# Patient Record
Sex: MALE | Race: WHITE | Employment: OTHER | URBAN - METROPOLITAN AREA
[De-identification: names, ages, dates, MRNs, and addresses within clinical notes are randomized per-mention and may not be internally consistent; named-entity substitution may affect disease eponyms.]

---

## 2017-05-09 ENCOUNTER — OFFICE VISIT (OUTPATIENT)
Dept: DERMATOLOGY | Age: 72
End: 2017-05-09

## 2017-05-09 DIAGNOSIS — L57.0 AK (ACTINIC KERATOSIS): Primary | ICD-10-CM

## 2017-05-09 DIAGNOSIS — L82.1 SEBORRHEIC KERATOSES: ICD-10-CM

## 2017-05-09 DIAGNOSIS — D22.9 BENIGN NEVUS: ICD-10-CM

## 2017-05-09 DIAGNOSIS — L57.8 DIFFUSE PHOTODAMAGE OF SKIN: ICD-10-CM

## 2017-05-09 PROCEDURE — 17003 DESTRUCT PREMALG LES 2-14: CPT | Performed by: DERMATOLOGY

## 2017-05-09 PROCEDURE — 17000 DESTRUCT PREMALG LESION: CPT | Performed by: DERMATOLOGY

## 2017-05-09 PROCEDURE — 99213 OFFICE O/P EST LOW 20 MIN: CPT | Performed by: DERMATOLOGY

## 2017-11-14 ENCOUNTER — OFFICE VISIT (OUTPATIENT)
Dept: DERMATOLOGY | Age: 72
End: 2017-11-14

## 2017-11-14 DIAGNOSIS — L82.0 SEBORRHEIC KERATOSES, INFLAMED: ICD-10-CM

## 2017-11-14 DIAGNOSIS — D22.9 BENIGN NEVUS: ICD-10-CM

## 2017-11-14 DIAGNOSIS — L82.1 SEBORRHEIC KERATOSES: ICD-10-CM

## 2017-11-14 DIAGNOSIS — D48.5 NEOPLASM OF UNCERTAIN BEHAVIOR OF SKIN: Primary | ICD-10-CM

## 2017-11-14 DIAGNOSIS — L57.0 AK (ACTINIC KERATOSIS): ICD-10-CM

## 2017-11-14 PROCEDURE — 11100 PR BIOPSY OF SKIN LESION: CPT | Performed by: DERMATOLOGY

## 2017-11-14 PROCEDURE — 99214 OFFICE O/P EST MOD 30 MIN: CPT | Performed by: DERMATOLOGY

## 2017-11-14 PROCEDURE — 17000 DESTRUCT PREMALG LESION: CPT | Performed by: DERMATOLOGY

## 2017-11-14 PROCEDURE — 17003 DESTRUCT PREMALG LES 2-14: CPT | Performed by: DERMATOLOGY

## 2017-11-14 PROCEDURE — 17110 DESTRUCTION B9 LES UP TO 14: CPT | Performed by: DERMATOLOGY

## 2017-11-14 NOTE — PROGRESS NOTES
[facility]) Dermatology  Michele Jo M.D.  [id number]  1945    71 y.o. male     Date of Visit: 11/14/2017    Chief Complaint:   Chief Complaint   Patient presents with    Skin Exam    6 Month Follow-Up        I was asked to see this patient by Dr. Polo ref. provider found. History of Present Illness:  1. Total body skin exam    Multiple rough papules vertex of scalp. Remain dry. Not itching, bleeding, growing    New inflamed papule left temple. Pruritic. Scaly. Multiple nevi. Stable in size, shape, color. Asymptomatic. Is wearing hats and sunscreen regularly. Kirstin downey    First grandchild due any day    Skin history:  History of actinic keratoses, no history of skin cancer. Status post Efudex 3/14 to his scalp    Review of Systems:  Constitutional: Reports general sense of well-being       Past Medical History, Surgical History, Family History, Medications and Allergies reviewed. Social History:   Social History     Social History    Marital status:      Spouse name: N/A    Number of children: N/A    Years of education: N/A     Occupational History    Not on file. Social History Main Topics    Smoking status: Former Smoker     Quit date: 8/1/1978    Smokeless tobacco: Never Used    Alcohol use No      Comment: 1-2 x year    Drug use: Unknown    Sexual activity: Not on file     Other Topics Concern    Not on file     Social History Narrative    No narrative on file       Physical Examination       -General: Well-appearing, NAD  Normal affect. Total body skin exam including scalp, face, neck, chest, abdomen, back, bilateral upper extremities, bilateral lower extremities, ocular conjunctiva, external lips, and nails was performed. Underwear area not examined. Scattered on the trunk and extremities are multiple well-defined round and oval symmetric smoothly-bordered uniformly brown macules and papules.   Multiple hyperkeratotic stuck on papules

## 2017-11-16 NOTE — PROGRESS NOTES
Addendum: Left upper forehead superficial basal cell carcinoma-return visit for definitive treatment with curettage

## 2017-11-20 ENCOUNTER — TELEPHONE (OUTPATIENT)
Dept: DERMATOLOGY | Age: 72
End: 2017-11-20

## 2017-11-29 ENCOUNTER — OFFICE VISIT (OUTPATIENT)
Dept: DERMATOLOGY | Age: 72
End: 2017-11-29

## 2017-11-29 DIAGNOSIS — C44.310 BASAL CELL CARCINOMA OF FACE: Primary | ICD-10-CM

## 2017-11-29 PROCEDURE — 17281 DSTR MAL LS F/E/E/N/L/M .6-1: CPT | Performed by: DERMATOLOGY

## 2017-11-29 NOTE — PROGRESS NOTES
[facility]) Dermatology  Rj Sousa M.D.  [id number]  1945    71 y.o. male     Date of Visit: 11/29/2017    Chief Complaint:   Chief Complaint   Patient presents with    Other     Curettage left upper forehead        I was asked to see this patient by Dr. Polo ref. provider found. History of Present Illness:  1. Patient presents today for definitive treatment of a superficial basal cell carcinoma left upper forehead. Biopsy done 11/14/17. First grandchild born last week-Nadeem      Review of Systems:  Constitutional: Reports general sense of well-being       Past Medical History, Surgical History, Family History, Medications and Allergies reviewed. Social History:   Social History     Social History    Marital status:      Spouse name: N/A    Number of children: N/A    Years of education: N/A     Occupational History    Not on file. Social History Main Topics    Smoking status: Former Smoker     Quit date: 8/1/1978    Smokeless tobacco: Never Used    Alcohol use No      Comment: 1-2 x year    Drug use: Unknown    Sexual activity: Not on file     Other Topics Concern    Not on file     Social History Narrative    No narrative on file       Physical Examination       -General: Well-appearing, NAD  Site confirmed with patient and prior picture-well healed pink scar. Assessment and Plan     1. Basal cell carcinoma of Left upper forehead-Curettage performed after informed written consent obtained following explanation of risks, benefits, alternatives  -Local anesthesia acheived with 1% lidocaine with epinephrine/sodium bicarbonate. Sharp curettage performed in 3 different directions. First pass size 6mm.  Hemostasis obtained with aluminum chloride.   -Edu re: bleeding, discomfort, infection, scar  -Edu re: wound care, risk of recurrence  6   \

## 2018-01-08 ENCOUNTER — OFFICE VISIT (OUTPATIENT)
Dept: ORTHOPEDIC SURGERY | Age: 73
End: 2018-01-08

## 2018-01-08 VITALS
HEIGHT: 68 IN | HEART RATE: 81 BPM | WEIGHT: 185 LBS | DIASTOLIC BLOOD PRESSURE: 84 MMHG | SYSTOLIC BLOOD PRESSURE: 136 MMHG | BODY MASS INDEX: 28.04 KG/M2

## 2018-01-08 DIAGNOSIS — M25.561 RIGHT KNEE PAIN, UNSPECIFIED CHRONICITY: Primary | ICD-10-CM

## 2018-01-08 PROCEDURE — 99213 OFFICE O/P EST LOW 20 MIN: CPT | Performed by: ORTHOPAEDIC SURGERY

## 2018-01-08 NOTE — PROGRESS NOTES
Examination:    Inspection:  No swelling, ecchymosis or deformity    Palpation:  Tenderness to palpation directly over the medial joint line    Range of Motion:  Well-preserved range of motion, 0-120°. Strength:  4+/5 quad and hamstring strength    Special Tests:  Positive Tho's examination. Stable to varus and valgus stress testing. Negative Lachman's exam    Skin: There are no rashes, ulcerations or lesions. Gait: Mild antalgic gait    Reflex normal deep tendon reflexes    Additional Comments:       Additional Examinations:         Contralateral Exam: Examination of the left knee reveals intact skin. There is no focal tenderness. The patient demonstrates full painless range of motion with regard to flexion and extension. Strength is 5/5 throughout all planes. Ligamentous stability is grossly intact. Examination of the right and left hip reveals intact skin. The patient demonstrates full painless range of motion with regards to flexion, abduction, internal and external rotation. There is no tenderness about the greater trochanter. There is a negative straight leg raise against resistance. Strength is 5/5 throughout all planes. Radiology:     X-rays obtained and reviewed in office:  Views 4 views including AP weightbearing, PA flexed weightbearing, lateral, and skyline  Location right knee  Impression well-maintained medial lateral and patellofemoral joint spaces. Impression:  Encounter Diagnosis   Name Primary?  Right knee pain, unspecified chronicity Yes       Office Procedures:  Orders Placed This Encounter   Procedures    XR KNEE RIGHT (MIN 4 VIEWS)     [id number]     Order Specific Question:   Reason for exam:     Answer:   Pain       Treatment Plan:  Patient has been ordered an MRI of the right knee to evaluate for medial meniscus tear. Patient will continue with over-the-counter medications and ice to control pain symptoms.   Consideration for arthroscopic surgery depending on

## 2018-01-17 ENCOUNTER — OFFICE VISIT (OUTPATIENT)
Dept: ORTHOPEDIC SURGERY | Age: 73
End: 2018-01-17

## 2018-01-17 DIAGNOSIS — M94.261 CHONDROMALACIA OF KNEE, RIGHT: ICD-10-CM

## 2018-01-17 DIAGNOSIS — S83.241A TEAR OF MEDIAL MENISCUS OF RIGHT KNEE, UNSPECIFIED TEAR TYPE, UNSPECIFIED WHETHER OLD OR CURRENT TEAR, INITIAL ENCOUNTER: Primary | ICD-10-CM

## 2018-01-17 PROCEDURE — 99214 OFFICE O/P EST MOD 30 MIN: CPT | Performed by: PHYSICIAN ASSISTANT

## 2018-01-17 NOTE — LETTER
performance of any surgical or medical procedure(s). I am aware that the practice of surgery and medicine is not an exact science, and I acknowledge that no guarantees have been made to me concerning the results of the procedure(s). 5) I consent to the taking of photographs before, during and after the procedure(s) for scientific and educational purposes. I also understand that medical students and residents may participate in the procedure(s) set forth in Paragraph 1, and I consent to their participation under the supervision of the above named physician. 6) I consent to the administration of anesthesia and to the use of such anesthetics as may be deemed advisable by the anesthesiologist engaged to administer anesthesia. 7) I certify that I have read and understand this consent to the surgical or medical procedure(s); that all the information contained herein was disclosed to me by the informing physician prior to my signing; that all blanks or statements requiring insertions or completion were filled in and inapplicable paragraphs, if any, were stricken before I signed; and that all questions asked by me about the procedure(s) have been fully answered by the informing physician in a satisfactory manner.     Would you like to schedule an appointment with Dr. Ricarda Case prior to surgery:   YES  / NO  ______Initial    ________________________________                           _______________________________  Signature of patient                                                                  Witness           Kassi Luevano PA-C / VIOLETA Thrasher M.D.   ________________________________                           ________________________________  Informing Physician Assistant                                                                 Physician (Print)    If patient is unable to sign or is a minor, complete one of the following:

## 2018-01-17 NOTE — PROGRESS NOTES
Chief Complaint    Results (mri rt knee)      History of Present Illness:  Lola Tavarez is a 71 y.o. male returns today for a follow-up on his right knee after undergoing a recent MRI. Symptoms are unchanged, continuing to report medial knee pain with locking and catching sensation. The results of the MRI indicate that he does have a large, trizonal, macerated medial meniscus tear measuring 3-4 cm. He also talked about some generalized inflammation of the MCL and a small dissecting Baker cyst.  The full radiology report as below. Pain Assessment  Location of Pain: Knee  Location Modifiers: Right  Severity of Pain: 4  Frequency of Pain: Intermittent  Aggravating Factors: Other (Comment)  Limiting Behavior: Some  Work-Related Injury: No  Are there other pain locations you wish to document?: No    Medical History:  Patient's medications, allergies, past medical, surgical, social and family histories were reviewed and updated as appropriate. Review of Systems:  Relevant review of systems reviewed and available in the patient's chart    Vital Signs: There were no vitals taken for this visit. General Exam:   Constitutional: Patient is adequately groomed with no evidence of malnutrition  DTRs: Deep tendon reflexes are intact  Mental Status: The patient is oriented to time, place and person. The patient's mood and affect are appropriate. Lymphatic: The lymphatic examination bilaterally reveals all areas to be without enlargement or induration. Vascular: Examination reveals no swelling or calf tenderness. Peripheral pulses are palpable and 2+. Neurological: The patient has good coordination. There is no weakness or sensory deficit. There is no height or weight on file to calculate BMI.     Right Knee Examination:    Inspection:  No swelling, ecchymosis or deformity    Palpation:  Tenderness to palpation directly over the medial joint line    Range of Motion:  Well-preserved range of motion,

## 2018-02-05 ENCOUNTER — TELEPHONE (OUTPATIENT)
Dept: ORTHOPEDIC SURGERY | Age: 73
End: 2018-02-05

## 2018-02-16 ENCOUNTER — HOSPITAL ENCOUNTER (OUTPATIENT)
Dept: SURGERY | Age: 73
Discharge: HOME | End: 2018-02-16
Attending: ORTHOPAEDIC SURGERY | Admitting: ORTHOPAEDIC SURGERY

## 2018-02-16 VITALS
BODY MASS INDEX: 28.49 KG/M2 | OXYGEN SATURATION: 100 % | HEART RATE: 80 BPM | HEIGHT: 68 IN | SYSTOLIC BLOOD PRESSURE: 131 MMHG | TEMPERATURE: 97 F | DIASTOLIC BLOOD PRESSURE: 79 MMHG | RESPIRATION RATE: 16 BRPM | WEIGHT: 188 LBS

## 2018-02-16 DIAGNOSIS — Z98.890 S/P RIGHT KNEE ARTHROSCOPY: Primary | ICD-10-CM

## 2018-02-16 RX ORDER — OXYCODONE HYDROCHLORIDE AND ACETAMINOPHEN 5; 325 MG/1; MG/1
1 TABLET ORAL PRN
Status: COMPLETED | OUTPATIENT
Start: 2018-02-16 | End: 2018-02-16

## 2018-02-16 RX ORDER — HYDROCODONE BITARTRATE AND ACETAMINOPHEN 5; 325 MG/1; MG/1
1 TABLET ORAL EVERY 6 HOURS PRN
Qty: 30 TABLET | Refills: 0 | Status: SHIPPED | OUTPATIENT
Start: 2018-02-16 | End: 2018-02-23

## 2018-02-16 RX ORDER — ONDANSETRON 2 MG/ML
4 INJECTION INTRAMUSCULAR; INTRAVENOUS EVERY 10 MIN PRN
Status: DISCONTINUED | OUTPATIENT
Start: 2018-02-16 | End: 2018-02-17 | Stop reason: HOSPADM

## 2018-02-16 RX ORDER — LABETALOL HYDROCHLORIDE 5 MG/ML
5 INJECTION, SOLUTION INTRAVENOUS EVERY 10 MIN PRN
Status: DISCONTINUED | OUTPATIENT
Start: 2018-02-16 | End: 2018-02-17 | Stop reason: HOSPADM

## 2018-02-16 RX ORDER — SODIUM CHLORIDE, SODIUM LACTATE, POTASSIUM CHLORIDE, CALCIUM CHLORIDE 600; 310; 30; 20 MG/100ML; MG/100ML; MG/100ML; MG/100ML
INJECTION, SOLUTION INTRAVENOUS CONTINUOUS
Status: DISCONTINUED | OUTPATIENT
Start: 2018-02-16 | End: 2018-02-17 | Stop reason: HOSPADM

## 2018-02-16 RX ORDER — LIDOCAINE HYDROCHLORIDE 10 MG/ML
1 INJECTION, SOLUTION EPIDURAL; INFILTRATION; INTRACAUDAL; PERINEURAL
Status: ACTIVE | OUTPATIENT
Start: 2018-02-16 | End: 2018-02-16

## 2018-02-16 RX ORDER — HYDRALAZINE HYDROCHLORIDE 20 MG/ML
5 INJECTION INTRAMUSCULAR; INTRAVENOUS EVERY 10 MIN PRN
Status: DISCONTINUED | OUTPATIENT
Start: 2018-02-16 | End: 2018-02-17 | Stop reason: HOSPADM

## 2018-02-16 RX ORDER — MEPERIDINE HYDROCHLORIDE 50 MG/ML
12.5 INJECTION INTRAMUSCULAR; INTRAVENOUS; SUBCUTANEOUS EVERY 5 MIN PRN
Status: DISCONTINUED | OUTPATIENT
Start: 2018-02-16 | End: 2018-02-17 | Stop reason: HOSPADM

## 2018-02-16 RX ORDER — OXYCODONE HYDROCHLORIDE AND ACETAMINOPHEN 5; 325 MG/1; MG/1
2 TABLET ORAL PRN
Status: COMPLETED | OUTPATIENT
Start: 2018-02-16 | End: 2018-02-16

## 2018-02-16 RX ADMIN — SODIUM CHLORIDE, SODIUM LACTATE, POTASSIUM CHLORIDE, CALCIUM CHLORIDE: 600; 310; 30; 20 INJECTION, SOLUTION INTRAVENOUS at 09:53

## 2018-02-16 RX ADMIN — OXYCODONE HYDROCHLORIDE AND ACETAMINOPHEN 1 TABLET: 5; 325 TABLET ORAL at 12:21

## 2018-02-16 ASSESSMENT — PAIN SCALES - GENERAL
PAINLEVEL_OUTOF10: 0
PAINLEVEL_OUTOF10: 6
PAINLEVEL_OUTOF10: 5

## 2018-02-16 ASSESSMENT — PAIN - FUNCTIONAL ASSESSMENT: PAIN_FUNCTIONAL_ASSESSMENT: 0-10

## 2018-02-16 NOTE — BRIEF OP NOTE
Brief Postoperative Note    Darlin Canales  YOB: 1945  [id number]    Pre-operative Diagnosis: Right KNEE MEDIAL MENISCUS TEAR; CMP     Post-operative Diagnosis: Same    Procedure: RT KNEE DVA; PARTIAL MEDIAL MENISECTOMY; CHONDROPLASTY PFJ     Anesthesia: General    Surgeons/Assistants: Abiel Bañuelos MD     Estimated Blood Loss: less than 50     Complications: None    Specimens: Was Not Obtained    Findings: AS ABOVE    Electronically signed by Abiel Bañuelos MD on 2/16/2018 at 11:46 AM

## 2018-02-17 NOTE — OP NOTE
cartilages  back to a stable rim using a 4.0 shaver. There was some anterior  synovitis, which was excised using a 4.0 shaver and the ArthroCare  radiofrequency device. 2.  THE MEDIAL COMPARTMENT:  The medial compartment had a radial tear of  the posterior horn and mid body for which a partial medial meniscectomy was  performed using a combination of baskets, 4.0 shaver, and an ArthroCare  radiofrequency device sculpting the meniscus tear back to a stable rim. Cartilage surfaces had some grade 2 chondral changes throughout the tibial  plateau and medial femoral condyle for which a chondroplasty was performed  debriding loose cartilages back to a stable rim. 3.  THE NOTCH:  The notch had an intact ACL and PCL with no evidence of  ligamentous instability. 4.  THE LATERAL COMPARTMENT:  The lateral compartment had no evidence of  lateral meniscus tear and the meniscus was thoroughly probed and felt to be  intact. The cartilage surfaces both on the femoral and tibial side were  intact. The knee was then irrigated with copious amounts of irrigation solution. The portals were closed using 4-0 Monocryl suture. Dry sterile compression  dressing was applied. The patient was then taken to the recovery room in  stable condition having tolerated the procedure well.         Deidra Ramirez MD    D: 02/16/2018 11:52:14       T: 02/16/2018 16:30:37     [email or web address]  Job#: [id number]     Doc#: [id number]    CC:  Fernando Sapp MD

## 2018-02-21 ENCOUNTER — HOSPITAL ENCOUNTER (OUTPATIENT)
Dept: PHYSICAL THERAPY | Age: 73
Discharge: HOME | End: 2018-02-28
Admitting: ORTHOPAEDIC SURGERY

## 2018-02-21 NOTE — FLOWSHEET NOTE
verbal/tactile cueing for activities related to improving balance, coordination, kinesthetic sense, posture, motor skill, proprioception  to assist with LE, proximal hip, and core control in self care, mobility, lifting, ambulation and eccentric single leg control. Home Exercise Program:    [x] (14368) Reviewed/Progressed HEP activities related to strengthening, flexibility, endurance, ROM of core, proximal hip and LE for functional self-care, mobility, lifting and ambulation/stair navigation   [x] (66575)Reviewed/Progressed HEP activities related to improving balance, coordination, kinesthetic sense, posture, motor skill, proprioception of core, proximal hip and LE for self care, mobility, lifting, and ambulation/stair navigation      Manual Treatments:  PROM / STM / Oscillations-Mobs:  G-I, II, III, IV (PA's, Inf., Post.)  [x] (62601) Provided manual therapy to mobilize LE, proximal hip and/or LS spine soft tissue/joints for the purpose of modulating pain, promoting relaxation,  increasing ROM, reducing/eliminating soft tissue swelling/inflammation/restriction, improving soft tissue extensibility and allowing for proper ROM for normal function with self care, mobility, lifting and ambulation. Modalities: cp 10 min      Charges:  Timed Code Treatment Minutes: Total Treatment Minutes:      [] EVAL (LOW) 31488 (typically 20 minutes face-to-face)  [] EVAL (MOD) 11398 (typically 30 minutes face-to-face)  [] EVAL (HIGH) 58893 (typically 45 minutes face-to-face)  [] RE-EVAL     [] DO(56510) x      [] Ionto  [] NMR (33588) x      [] Vaso  [] Manual (16282) x       [] Mech Traction  [] ES (unattended):   [] Other:     GOALS:  Therapist goals for Patient:   Short Term Goals: To be achieved in: 2 weeks  1. Independent in HEP and progression per patient tolerance, in order to prevent re-injury.    2. Patient will have a decrease in pain to facilitate improvement in movement, function, and ADLs as indicated by Functional Deficits.     Long Term Goals: To be achieved in: 12 weeks  1. Disability index score of 10% or less for the LEFS to assist with reaching prior level of function. 2. Patient will demonstrate increased AROM to equal the opposite side bilaterally to allow for proper joint functioning as indicated by patients Functional Deficits. 3. Patient will demonstrate an increase in strength to LE MMT scores to match bilaterally to allow for proper functional mobility as indicated by patients Functional Deficits. 4. Patient will return to all transfers, work activities, and functional activities without increased symptoms or restriction. 5. Patient will have 0/10 pain with ADL's.  6. Patient stated goal: Return to golf this spring. Goals that are underlined signify the goal has been accomplished. Progression Towards Functional goals:  [] Patient is progressing as expected towards functional goals listed. [] Progression is slowed due to complexities listed. [] Progression has been slowed due to co-morbidities.   [x] Plan just implemented, too soon to assess goals progression  [] Other:     ASSESSMENT:  See eval    Treatment/Activity Tolerance:   [x] Patient tolerated treatment well [] Patient limited by fatique  [] Patient limited by pain  [] Patient limited by other medical complications  [] Other:     Prognosis: [] Good [] Fair  [] Poor    Patient Requires Follow-up: [x] Yes  [] No    PLAN: See eval  [] Continue per plan of care [] Alter current plan (see comments)  [x] Plan of care initiated [] Hold pending MD visit [] Discharge    Electronically signed by: Aida Weiss PT

## 2018-02-21 NOTE — PLAN OF CARE
Endocrine conditions   []Hypothyroid (E03.9)  []Hyperthyroid Gastrointestinal conditions   []Constipation (G24.04)   Metabolic conditions   []Morbid obesity (E66.01)  []Diabetes type 1(E10.65) or 2 (E11.65)   []Neuropathy (G60.9)     Pulmonary conditions   []Asthma (J45)  []Coughing   []COPD (J44.9)   Psychological Disorders  []Anxiety (F41.9)  []Depression (F32.9)   []Other:   []Other:          Barriers to/and or personal factors that will affect rehab potential:              []Age  []Sex              []Motivation/Lack of Motivation                        []Co-Morbidities              []Cognitive Function, education/learning barriers              []Environmental, home barriers              []profession/work barriers  []past PT/medical experience  []other:  Justification:     Falls Risk Assessment (30 days):   [x] Falls Risk assessed and no intervention required.   [] Falls Risk assessed and Patient requires intervention due to being higher risk   TUG score (>12s at risk):     [] Falls education provided, including       G-Codes:  PT G-Codes  Functional Assessment Tool Used: LEFS  Score: 40%    ASSESSMENT:   Functional Impairments:     []Noted lumbar/proximal hip/LE joint hypomobility   []Decreased LE functional ROM   [x]Decreased core/proximal hip strength and neuromuscular control   [x]Decreased LE functional strength   [x]Reduced balance/proprioceptive control   []other:      Functional Activity Limitations (from functional questionnaire and intake)   []Reduced ability to tolerate prolonged functional positions   []Reduced ability or difficulty with changes of positions or transfers between positions   []Reduced ability to maintain good posture and demonstrate good body mechanics with sitting, bending, and lifting   [x]Reduced ability to sleep   [x] Reduced ability or tolerance with driving and/or computer work   [x]Reduced ability to perform lifting, carrying tasks   [x]Reduced ability to squat   [x]Reduced

## 2018-02-22 ENCOUNTER — OFFICE VISIT (OUTPATIENT)
Dept: ORTHOPEDIC SURGERY | Age: 73
End: 2018-02-22

## 2018-02-22 VITALS
BODY MASS INDEX: 28.5 KG/M2 | HEART RATE: 77 BPM | WEIGHT: 188.05 LBS | HEIGHT: 68 IN | SYSTOLIC BLOOD PRESSURE: 130 MMHG | DIASTOLIC BLOOD PRESSURE: 73 MMHG

## 2018-02-22 DIAGNOSIS — M94.261 CHONDROMALACIA OF KNEE, RIGHT: ICD-10-CM

## 2018-02-22 DIAGNOSIS — S83.241D TEAR OF MEDIAL MENISCUS OF RIGHT KNEE, UNSPECIFIED TEAR TYPE, UNSPECIFIED WHETHER OLD OR CURRENT TEAR, SUBSEQUENT ENCOUNTER: Primary | ICD-10-CM

## 2018-02-22 PROCEDURE — 99024 POSTOP FOLLOW-UP VISIT: CPT | Performed by: ORTHOPAEDIC SURGERY

## 2018-02-26 ENCOUNTER — HOSPITAL ENCOUNTER (OUTPATIENT)
Dept: PHYSICAL THERAPY | Age: 73
Discharge: HOME | End: 2018-02-27
Admitting: ORTHOPAEDIC SURGERY

## 2018-02-26 NOTE — FLOWSHEET NOTE
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 Medicare  Physician Information:  Referring Practitioner: Carlos Mckeon of care signed (Y/N):     Date of Patient follow up with Physician: 18    G-Code (if applicable):      Date G-Code Applied:  2018       Progress Note: [x]  Yes  []  No      Latex Allergy:  [x]NO      []YES  Preferred Language for Healthcare:   [x]English       []other:    Visit # Insurance Allowable   2 Med nec     Pain level:  0-1/10     SUBJECTIVE:  Feeling good. Making progress.      OBJECTIVE: See eval  Observation:   Test measurements:    Patient educated on following:    RESTRICTIONS/PRECAUTIONS:     Exercises/Interventions:   Therapeutic Ex Wt/Sets/Reps/Hold Notes   Bike 5 min 0 resistance / fatigue   Slant board 3x30\"    HR x30    HSC x20 R    Hip abd x20 ea    Mini squats x20         TKE 60# x20 R    Leg Press  Leg Press 100# x20 DL  60# x20 SL              Seated belt stretch 3x30\"    QS 10x10\"    Seated HSS 3x30\"    Heel slides x10    Heel prop 2 x 1 min 4#   EOB knee flexion 10x10\"    SLR x30    SSLR x30     LAQ 3# x20                                       Manual     Gr I-II mobs for tissue reactivity     PROM-all planes     GR III-IV mobs for arthrokinematics     Lumbar/long axis distraction     Thoracic PA mobs     PNF for strengthening     PNF for agonist/antagonist inhibition          Pt education/reminders       Therapeutic Exercise and NMR EXR  [x] (74318) Provided verbal/tactile cueing for activities related to strengthening, flexibility, endurance, ROM for improvements in LE, proximal hip, and core control with self care, mobility, lifting, ambulation. [x] (92627) Provided verbal/tactile cueing for activities related to improving balance, coordination, kinesthetic sense, posture, motor skill, proprioception  to assist with LE, proximal hip, and core control in self care, mobility, lifting, ambulation and eccentric single leg control. Home Exercise Program:    [x] (31722) Reviewed/Progressed HEP activities related to strengthening, flexibility, endurance, ROM of core, proximal hip and LE for functional self-care, mobility, lifting and ambulation/stair navigation   [x] (43684)Reviewed/Progressed HEP activities related to improving balance, coordination, kinesthetic sense, posture, motor skill, proprioception of core, proximal hip and LE for self care, mobility, lifting, and ambulation/stair navigation      Manual Treatments:  PROM / STM / Oscillations-Mobs:  G-I, II, III, IV (PA's, Inf., Post.)  [x] (54795) Provided manual therapy to mobilize LE, proximal hip and/or LS spine soft tissue/joints for the purpose of modulating pain, promoting relaxation,  increasing ROM, reducing/eliminating soft tissue swelling/inflammation/restriction, improving soft tissue extensibility and allowing for proper ROM for normal function with self care, mobility, lifting and ambulation. Modalities:       Charges:  Timed Code Treatment Minutes: Total Treatment Minutes:      [] EVAL (LOW) 64057 (typically 20 minutes face-to-face)  [] EVAL (MOD) 84921 (typically 30 minutes face-to-face)  [] EVAL (HIGH) 96287 (typically 45 minutes face-to-face)  [] RE-EVAL     [] PN(51193) x      [] Ionto  [] NMR (34587) x      [] Vaso  [] Manual (66943) x       [] Mech Traction  [] ES (unattended):   [] Other:     GOALS:  Therapist goals for Patient:   Short Term Goals: To be achieved in: 2 weeks  1. Independent in HEP and progression per patient tolerance, in order to prevent re-injury.    2. Patient will have a decrease in pain to facilitate improvement in movement, function, and

## 2018-03-01 ENCOUNTER — HOSPITAL ENCOUNTER (OUTPATIENT)
Dept: PHYSICAL THERAPY | Age: 73
Discharge: HOME | End: 2018-03-31
Attending: ORTHOPAEDIC SURGERY | Admitting: ORTHOPAEDIC SURGERY

## 2018-03-14 ENCOUNTER — HOSPITAL ENCOUNTER (OUTPATIENT)
Dept: PHYSICAL THERAPY | Age: 73
Discharge: HOME | End: 2018-03-15
Admitting: ORTHOPAEDIC SURGERY

## 2018-03-14 NOTE — FLOWSHEET NOTE
[address] [facility]    Physical Therapy Daily Treatment Note  Date:  3/14/2018    Patient Name:  Anthony Tidwell    :    MRN: [id number]  Medical/Treatment Diagnosis Information:  · Diagnosis: R knee PMM (18)  · Treatment Diagnosis: M17.11, P20.493  Insurance/Certification information:  PT Insurance Information: [employer] Medicare  Physician Information:  Referring Practitioner: Rick Hammond of care signed (Y/N):     Date of Patient follow up with Physician: 18    G-Code (if applicable):      Date G-Code Applied:  3/14/2018       Progress Note: [x]  Yes  []  No      Latex Allergy:  [x]NO      []YES  Preferred Language for Healthcare:   [x]English       []other:    Visit # Insurance Allowable   3 Med nec     Pain level:  0-1/10     SUBJECTIVE:  Having some right hip pain in the mornings, but it goes away as the day goes on.      OBJECTIVE: See eval  Observation:   Test measurements:      Knee flexion: 126 °          Hip flexion: 18.2 lbs         Hip Abd: 19.7 lbs         Jt line girth: 38 cm   Patient educated on following:    RESTRICTIONS/PRECAUTIONS:     Exercises/Interventions:   Therapeutic Ex Wt/Sets/Reps/Hold Notes   Bike 5 min 3 resistance / fatigue   Slant board 3x30\"    HR x30    Standing [address] x20 R    Hip abd, BLANCA 2x15 each, 45 lbs    Mini squats 2x20    SLS 10x10\"  Airex   HSC 40# x30                   TKE  BLANCA ABD 75# x30 R  45# x30 ea    Leg Press   120# x30 DL  80# x30 SL  100# x15 ECC              Seated belt stretch 3x30\"    QS 10x10\"    Seated HSS 3x30\"    Heel slides x10    Heel prop 2 x 1 min 4#   EOB knee flexion 10x10\"    SLR x30 2#   SSLR x30  2#   LAQ 7.5# x20    SAQ 3\" x20 2#                                 Manual     Gr I-II mobs for tissue reactivity     PROM-all planes     GR III-IV mobs for arthrokinematics     Lumbar/long axis distraction     Thoracic PA mobs     PNF for strengthening     PNF for agonist/antagonist inhibition Pt education/reminders       Therapeutic Exercise and NMR EXR  [x] (10227) Provided verbal/tactile cueing for activities related to strengthening, flexibility, endurance, ROM for improvements in LE, proximal hip, and core control with self care, mobility, lifting, ambulation. [x] (24122) Provided verbal/tactile cueing for activities related to improving balance, coordination, kinesthetic sense, posture, motor skill, proprioception  to assist with LE, proximal hip, and core control in self care, mobility, lifting, ambulation and eccentric single leg control. Home Exercise Program:    [x] (58264) Reviewed/Progressed HEP activities related to strengthening, flexibility, endurance, ROM of core, proximal hip and LE for functional self-care, mobility, lifting and ambulation/stair navigation   [x] (62529)Reviewed/Progressed HEP activities related to improving balance, coordination, kinesthetic sense, posture, motor skill, proprioception of core, proximal hip and LE for self care, mobility, lifting, and ambulation/stair navigation      Manual Treatments:  PROM / STM / Oscillations-Mobs:  G-I, II, III, IV (PA's, Inf., Post.)  [x] (99178) Provided manual therapy to mobilize LE, proximal hip and/or LS spine soft tissue/joints for the purpose of modulating pain, promoting relaxation,  increasing ROM, reducing/eliminating soft tissue swelling/inflammation/restriction, improving soft tissue extensibility and allowing for proper ROM for normal function with self care, mobility, lifting and ambulation. Modalities:       Charges:  Timed Code Treatment Minutes:     Total Treatment Minutes:      [] EVAL (LOW) 78130 (typically 20 minutes face-to-face)  [] EVAL (MOD) 97367 (typically 30 minutes face-to-face)  [] EVAL (HIGH) 19590 (typically 45 minutes face-to-face)  [] RE-EVAL     [] UW(88929) x      [] Ionto  [] NMR (86266) x      [] Vaso  [] Manual (47395) x       [] Mech Traction  [] ES (unattended):   []

## 2018-05-15 ENCOUNTER — OFFICE VISIT (OUTPATIENT)
Dept: DERMATOLOGY | Age: 73
End: 2018-05-15

## 2018-05-15 DIAGNOSIS — L82.1 SEBORRHEIC KERATOSES: ICD-10-CM

## 2018-05-15 DIAGNOSIS — Z85.828 HISTORY OF BASAL CELL CANCER: ICD-10-CM

## 2018-05-15 DIAGNOSIS — L91.0 HYPERTROPHIC SCAR: ICD-10-CM

## 2018-05-15 DIAGNOSIS — D22.9 BENIGN NEVUS: Primary | ICD-10-CM

## 2018-05-15 DIAGNOSIS — L57.8 DIFFUSE PHOTODAMAGE OF SKIN: ICD-10-CM

## 2018-05-15 PROCEDURE — 99214 OFFICE O/P EST MOD 30 MIN: CPT | Performed by: DERMATOLOGY

## 2018-05-15 ASSESSMENT — PATIENT HEALTH QUESTIONNAIRE - PHQ9
2. FEELING DOWN, DEPRESSED OR HOPELESS: 0
SUM OF ALL RESPONSES TO PHQ QUESTIONS 1-9: 0
1. LITTLE INTEREST OR PLEASURE IN DOING THINGS: 0
SUM OF ALL RESPONSES TO PHQ9 QUESTIONS 1 & 2: 0

## 2018-11-20 ENCOUNTER — OFFICE VISIT (OUTPATIENT)
Dept: DERMATOLOGY | Age: 73
End: 2018-11-20
Payer: COMMERCIAL

## 2018-11-20 DIAGNOSIS — L71.9 ROSACEA: ICD-10-CM

## 2018-11-20 DIAGNOSIS — D22.9 BENIGN NEVUS: ICD-10-CM

## 2018-11-20 DIAGNOSIS — L57.0 AK (ACTINIC KERATOSIS): Primary | ICD-10-CM

## 2018-11-20 DIAGNOSIS — Z85.828 HISTORY OF BASAL CELL CANCER: ICD-10-CM

## 2018-11-20 PROCEDURE — 17000 DESTRUCT PREMALG LESION: CPT | Performed by: DERMATOLOGY

## 2018-11-20 PROCEDURE — 17003 DESTRUCT PREMALG LES 2-14: CPT | Performed by: DERMATOLOGY

## 2018-11-20 PROCEDURE — 99214 OFFICE O/P EST MOD 30 MIN: CPT | Performed by: DERMATOLOGY

## 2018-11-20 RX ORDER — METRONIDAZOLE 7.5 MG/G
GEL TOPICAL
Qty: 60 G | Refills: 3 | Status: SHIPPED | OUTPATIENT
Start: 2018-11-20 | End: 2021-03-16 | Stop reason: SDUPTHER

## 2018-11-20 NOTE — PROGRESS NOTES
[facility]) Dermatology  Ez Orozco M.D.  [address]  1945    68 y.o. male     Date of Visit: 11/20/2018    Chief Complaint:   Chief Complaint   Patient presents with    Skin Exam     TBSE        I was asked to see this patient by Dr. Polo ref. provider found. History of Present Illness:  1. Total body skin exam    Long history of background erythema of face-new inflammatory papules nose, cheeks. Worsening over the last several weeks. Not previously treated. Actinic keratoses scalp-dry. Not itching, bleeding, growing    Multiple nevi. Stable in size, shape, color. Has not noticed any new or changing pigmented lesions. Is wearing hats and sunscreen regularly. Skin history:  History of actinic keratoses- status post Efudex 3/14 to his scalp     11/17 left upper forehead superficial basal cell carcinoma status post curettage    Review of Systems:  Constitutional: Reports general sense of well-being   + Cough, upper respiratory infection    Past Medical History, Surgical History, Family History, Medications and Allergies reviewed. Social History:   Social History     Social History    Marital status:      Spouse name: N/A    Number of children: N/A    Years of education: N/A     Occupational History    Not on file. Social History Main Topics    Smoking status: Former Smoker     Quit date: 8/1/1978    Smokeless tobacco: Never Used    Alcohol use No      Comment: 1-2 x year    Drug use: No    Sexual activity: Not on file     Other Topics Concern    Not on file     Social History Narrative    No narrative on file       Physical Examination       -General: Well-appearing, NAD  1. Normal affect. Total body skin exam including scalp, face, neck, chest, abdomen, back, bilateral upper extremities, bilateral lower extremities, ocular conjunctiva, external lips, and nails was performed. Examination normal unless stated below.   Underwear area not

## 2019-05-21 ENCOUNTER — OFFICE VISIT (OUTPATIENT)
Dept: DERMATOLOGY | Age: 74
End: 2019-05-21
Payer: COMMERCIAL

## 2019-05-21 DIAGNOSIS — Z85.828 HISTORY OF BASAL CELL CANCER: ICD-10-CM

## 2019-05-21 DIAGNOSIS — L57.0 AK (ACTINIC KERATOSIS): Primary | ICD-10-CM

## 2019-05-21 DIAGNOSIS — D22.9 BENIGN NEVUS: ICD-10-CM

## 2019-05-21 DIAGNOSIS — L71.9 ROSACEA: ICD-10-CM

## 2019-05-21 PROCEDURE — 99214 OFFICE O/P EST MOD 30 MIN: CPT | Performed by: DERMATOLOGY

## 2019-05-21 PROCEDURE — 17003 DESTRUCT PREMALG LES 2-14: CPT | Performed by: DERMATOLOGY

## 2019-05-21 PROCEDURE — 17000 DESTRUCT PREMALG LESION: CPT | Performed by: DERMATOLOGY

## 2019-05-21 NOTE — PROGRESS NOTES
[facility]) Dermatology  Unknown HERMES Platt  [email or web address]  1945    69 y.o. male     Date of Visit: 2019    Chief Complaint:   Chief Complaint   Patient presents with    Actinic Keratosis     6 mo TBSE; no new concerns         I was asked to see this patient by Dr. Polo ref. provider found. History of Present Illness:  1. Total body skin exam    Rosacea-good improvement with MetroGel which he uses sparingly-was expensive. Does no excellent improvement of inflammatory papules. Only uses this intermittently. Dry papules left cheek-not itching, bleeding, growing    Multiple nevi. Stable in size, shape, color. Has not noticed any new or changing pigmented lesions    Does wear hats and sunscreen    25month-old grandson-Nadeem    Skin history:  History of actinic keratoses- status post Efudex 3/14 to his scalp      left upper forehead superficial basal cell carcinoma status post curettage    Rosacea- Metrogel . 75% script given     Review of Systems:  Constitutional: Reports general sense of well-being       Past Medical History, Surgical History, Family History, Medications and Allergies reviewed.     Social History:   Social History     Socioeconomic History    Marital status:      Spouse name: Not on file    Number of children: Not on file    Years of education: Not on file    Highest education level: Not on file   Occupational History    Not on file   Social Needs    Financial resource strain: Not on file    Food insecurity:     Worry: Not on file     Inability: Not on file    Transportation needs:     Medical: Not on file     Non-medical: Not on file   Tobacco Use    Smoking status: Former Smoker     Last attempt to quit: 1978     Years since quittin.8    Smokeless tobacco: Never Used   Substance and Sexual Activity    Alcohol use: No     Comment: 1-2 x year    Drug use: No    Sexual activity: Not on file   Lifestyle    Physical activity: Days per week: Not on file     Minutes per session: Not on file    Stress: Not on file   Relationships    Social connections:     Talks on phone: Not on file     Gets together: Not on file     Attends Anglican service: Not on file     Active member of club or organization: Not on file     Attends meetings of clubs or organizations: Not on file     Relationship status: Not on file    Intimate partner violence:     Fear of current or ex partner: Not on file     Emotionally abused: Not on file     Physically abused: Not on file     Forced sexual activity: Not on file   Other Topics Concern    Not on file   Social History Narrative    Not on file       Physical Examination       -General: Well-appearing, NAD  1. Normal affect. Total body skin exam including scalp, face, neck, chest, abdomen, back, bilateral upper extremities, bilateral lower extremities, ocular conjunctiva, external lips, and nails was performed. Examination normal unless stated below. Underwear area not examined. Scattered on the trunk and extremities are multiple well-defined round and oval symmetric smoothly-bordered uniformly brown macules and papules. Gritty erythematous papules left cheek. Background erythema of the entire face-no active inflammatory papules. Well-healed scar at site of prior basal cell carcinoma no sign of recurrence      Assessment and Plan     1. AK (actinic keratosis) - left cheek-3 lesion(s) treated w/ liquid nitrogen. Edu re: risk of blister formation, discomfort, scar, hypopigmentation. Discussed wound care. 2. Rosacea -MetroGel 0.75% b.i.d. p.r.n. flares    3.  Benign nevus - Benign acquired melanocytic nevi  -Recommend monthly self skin exams   -Educated regarding the ABCDEs of melanoma detection   -Call for any new/changing moles or concerning lesions  -Reviewed sun protective behavior -- sun avoidance during the peak hours of the day, sun-protective clothing (including hat and sunglasses), sunscreen use (water resistant, broad spectrum, SPF at least 30, need for reapplication every 2 to 3 hours), avoidance of tanning beds      4. History of basal cell cancer - No evidence of recurrence. Discussed risk of subsequent skin cancers and increased risk of melanoma. Reviewed importance of monitoring skin for change and sun protection with hats and sunscreen, as well as sun avoidance.

## 2019-11-26 ENCOUNTER — OFFICE VISIT (OUTPATIENT)
Dept: DERMATOLOGY | Age: 74
End: 2019-11-26
Payer: COMMERCIAL

## 2019-11-26 DIAGNOSIS — D22.9 BENIGN NEVUS: ICD-10-CM

## 2019-11-26 DIAGNOSIS — Z85.828 HISTORY OF BASAL CELL CANCER: ICD-10-CM

## 2019-11-26 DIAGNOSIS — L71.9 ROSACEA: ICD-10-CM

## 2019-11-26 DIAGNOSIS — L57.0 AK (ACTINIC KERATOSIS): Primary | ICD-10-CM

## 2019-11-26 PROCEDURE — 99213 OFFICE O/P EST LOW 20 MIN: CPT | Performed by: DERMATOLOGY

## 2019-11-26 RX ORDER — FLUOROURACIL 50 MG/G
CREAM TOPICAL
Qty: 40 G | Refills: 0 | Status: SHIPPED | OUTPATIENT
Start: 2019-11-26 | End: 2020-09-22

## 2020-09-22 ENCOUNTER — OFFICE VISIT (OUTPATIENT)
Dept: DERMATOLOGY | Age: 75
End: 2020-09-22
Payer: COMMERCIAL

## 2020-09-22 VITALS — TEMPERATURE: 97.8 F

## 2020-09-22 PROCEDURE — 11102 TANGNTL BX SKIN SINGLE LES: CPT | Performed by: DERMATOLOGY

## 2020-09-22 PROCEDURE — 99214 OFFICE O/P EST MOD 30 MIN: CPT | Performed by: DERMATOLOGY

## 2020-09-22 RX ORDER — FLUOROURACIL 50 MG/G
CREAM TOPICAL
Qty: 40 G | Refills: 0 | Status: SHIPPED | OUTPATIENT
Start: 2020-09-22 | End: 2022-09-12 | Stop reason: ALTCHOICE

## 2020-09-22 NOTE — PROGRESS NOTES
[facility]) Dermatology  Musa Pope M.D.  [address]  1945    77 y.o. male     Date of Visit: 2020    Chief Complaint:   Chief Complaint   Patient presents with    Skin Lesion        I was asked to see this patient by Dr. Polo ref. provider found. History of Present Illness:  1. Total-body skin exam    Never treated right helix with Efudex-increasing number of actinic keratoses over his scalp, forehead, cheeks. Increasing in size and number. Last completed Efudex 3/14 for his scalp. Multiple nevi. Stable in size, shape, color. Has not noticed any new or changing pigmented lesions    New raised erythematous papule nasal supratip-present for months. Not itching, bleeding, growing      Skin history:  History of actinic keratoses- status post Efudex 3/14 to his scalp      left upper forehead superficial basal cell carcinoma status post curettage     Rosacea- Metrogel . 75% script given     Review of Systems:  Constitutional: Reports general sense of well-being   Skin-no new rashes  Past Medical History, Surgical History, Family History, Medications and Allergies reviewed.     Social History:   Social History     Socioeconomic History    Marital status:      Spouse name: Not on file    Number of children: Not on file    Years of education: Not on file    Highest education level: Not on file   Occupational History    Not on file   Social Needs    Financial resource strain: Not on file    Food insecurity     Worry: Not on file     Inability: Not on file    Transportation needs     Medical: Not on file     Non-medical: Not on file   Tobacco Use    Smoking status: Former Smoker     Last attempt to quit: 1978     Years since quittin.1    Smokeless tobacco: Never Used   Substance and Sexual Activity    Alcohol use: No     Comment: 1-2 x year    Drug use: No    Sexual activity: Not on file   Lifestyle    Physical activity     Days per week: Not on file     Minutes per session: Not on file    Stress: Not on file   Relationships    Social connections     Talks on phone: Not on file     Gets together: Not on file     Attends Latter day service: Not on file     Active member of club or organization: Not on file     Attends meetings of clubs or organizations: Not on file     Relationship status: Not on file    Intimate partner violence     Fear of current or ex partner: Not on file     Emotionally abused: Not on file     Physically abused: Not on file     Forced sexual activity: Not on file   Other Topics Concern    Not on file   Social History Narrative    Not on file       Physical Examination       -General: Well-appearing, NAD  1. Normal affect. Total body skin exam including scalp, face, neck, chest, abdomen, back, bilateral upper extremities, bilateral lower extremities, ocular conjunctiva, external lips, and nails was performed. Examination normal unless stated below. Underwear area not examined. Scattered on the trunk and extremities are multiple well-defined round and oval symmetric smoothly-bordered uniformly brown macules and papules. Multiple gritty erythematous papules scalp, forehead, temples, cheeks, right mid helix. Nasal supratip 3 mm pink papule, suggestion of pustule, cyst, rule out basal cell carcinoma            Assessment and Plan     1. Neoplasm of uncertain behavior of skin -nasal supratip--Discussed possible diagnosis. Patient agreeable to biopsy. Verbal consent obtained after risks (infection, bleeding, scar), benefits and alternatives explained. -Area(s) to be biopsied were marked with a surgical pen. Site(s) were cleansed with alcohol. Local anesthesia achieved with 1% lidocaine with epinephrine/sodium bicarbonate. Shave biopsy performed with a razor blade. Hemostasis was achieved with aluminum chloride. The wound(s) were dressed with petrolatum and covered with a bandage. Specimen(s) sent to pathology.  Pt educated

## 2020-09-28 LAB — DERMATOLOGY PATHOLOGY REPORT: NORMAL

## 2021-03-16 ENCOUNTER — OFFICE VISIT (OUTPATIENT)
Dept: DERMATOLOGY | Age: 76
End: 2021-03-16
Payer: MEDICARE

## 2021-03-16 VITALS — TEMPERATURE: 98.6 F

## 2021-03-16 DIAGNOSIS — L71.9 ROSACEA: ICD-10-CM

## 2021-03-16 DIAGNOSIS — D22.9 BENIGN NEVUS: ICD-10-CM

## 2021-03-16 DIAGNOSIS — L57.0 ACTINIC KERATOSIS TREATED WITH TOPICAL FLUOROURACIL (5FU): Primary | ICD-10-CM

## 2021-03-16 DIAGNOSIS — Z85.828 HISTORY OF BASAL CELL CANCER: ICD-10-CM

## 2021-03-16 PROCEDURE — 99214 OFFICE O/P EST MOD 30 MIN: CPT | Performed by: DERMATOLOGY

## 2021-03-16 RX ORDER — MINOCYCLINE HYDROCHLORIDE 100 MG/1
CAPSULE ORAL
Qty: 60 CAPSULE | Refills: 1 | Status: SHIPPED | OUTPATIENT
Start: 2021-03-16 | End: 2021-09-21 | Stop reason: ALTCHOICE

## 2021-03-16 RX ORDER — METRONIDAZOLE 7.5 MG/G
GEL TOPICAL
Qty: 60 G | Refills: 3 | Status: SHIPPED | OUTPATIENT
Start: 2021-03-16

## 2021-03-16 NOTE — PROGRESS NOTES
[facility]) Dermatology  Peggyann Cushing, M.D.  [id number]  1945    77 y.o. male     Date of Visit: 3/16/2021    Chief Complaint:   Chief Complaint   Patient presents with    Lesion(s)     TBSE, lesions on face. (Restarted Efudex,2-2021 completed 11 days), scalp, face        I was asked to see this patient by Dr. Polo ref. provider found. History of Present Illness:  1. Total-body skin exam    Efudex-started course of Efudex in December, developed Covid and discontinued Efudex. Restarted in February and completed 11 total days-developed quite a bit of erythema, crust.  Has had superficial desquamation and is still erythematous. Does note that his rosacea flared after Efudex-had been flaring prior to Efudex, but now flaring on his left cheek with multiple active inflammatory papules and some pustules. Did start MetroGel 0.75% and is using this twice daily. Minimal improvement in the last month. Multiple nevi. Stable in size, shape, color. Has not noticed any new or changing pigmented lesions    Skin history:  History of actinic keratoses- status post Efudex 3/14 to his scalp     11/17 left upper forehead superficial basal cell carcinoma status post curettage     Rosacea- Metrogel . 75% script given 11/18 2/21 Efudex anterior scalp, forehead, temples, cheeks    Review of Systems:  Constitutional: Reports general sense of well-being       Past Medical History, Surgical History, Family History, Medications and Allergies reviewed.     Social History:   Social History     Socioeconomic History    Marital status:      Spouse name: Not on file    Number of children: Not on file    Years of education: Not on file    Highest education level: Not on file   Occupational History    Not on file   Social Needs    Financial resource strain: Not on file    Food insecurity     Worry: Not on file     Inability: Not on file    Transportation needs     Medical: Not on file Non-medical: Not on file   Tobacco Use    Smoking status: Former Smoker     Quit date: 1978     Years since quittin.6    Smokeless tobacco: Never Used   Substance and Sexual Activity    Alcohol use: No     Comment: 1-2 x year    Drug use: No    Sexual activity: Not on file   Lifestyle    Physical activity     Days per week: Not on file     Minutes per session: Not on file    Stress: Not on file   Relationships    Social connections     Talks on phone: Not on file     Gets together: Not on file     Attends Nondenominational service: Not on file     Active member of club or organization: Not on file     Attends meetings of clubs or organizations: Not on file     Relationship status: Not on file    Intimate partner violence     Fear of current or ex partner: Not on file     Emotionally abused: Not on file     Physically abused: Not on file     Forced sexual activity: Not on file   Other Topics Concern    Not on file   Social History Narrative    Not on file       Physical Examination       -General: Well-appearing, NAD  Normal affect. Total body skin exam including scalp, face, neck, chest, abdomen, back, bilateral upper extremities, bilateral lower extremities, ocular conjunctiva, external lips, and nails was performed. Examination normal unless stated below. Underwear area not examined. Scattered on the trunk and extremities are multiple well-defined round and oval symmetric smoothly-bordered uniformly brown macules and papules. Background erythema face  Multiple active inflammatory papules and pustules left cheek worse than right-also involvement on neck and upper chest  Well-healed scars no sign of recurrence   Background erythema with superficial desquamation forehead, extending to temples, preauricular cheeks      Assessment and Plan     1. Actinic keratosis treated with topical fluorouracil (5FU)-discussed natural history of resolution of erythema and scale-discussed expectations for healing. Discussed hats and sunscreen   2. Benign nevus - Benign acquired melanocytic nevi  -Recommend monthly self skin exams   -Educated regarding the ABCDEs of melanoma detection   -Call for any new/changing moles or concerning lesions  -Reviewed sun protective behavior -- sun avoidance during the peak hours of the day, sun-protective clothing (including hat and sunglasses), sunscreen use (water resistant, broad spectrum, SPF at least 30, need for reapplication every 2 to 3 hours), avoidance of tanning beds      3. Rosacea-start minocycline 100 mg p.o. twice daily for 1 month followed by 100 mg p.o. daily. Reviewed side effects and contraindications including drug-induced lupus and pseudotumor. Reviewed photosensitivity. Continue MetroGel 0.75% twice daily   4. History of basal cell cancer - No evidence of recurrence. Discussed risk of subsequent skin cancers and increased risk of melanoma. Reviewed importance of monitoring skin for change and sun protection with hats and sunscreen, as well as sun avoidance.

## 2021-09-21 ENCOUNTER — OFFICE VISIT (OUTPATIENT)
Dept: DERMATOLOGY | Age: 76
End: 2021-09-21
Payer: MEDICARE

## 2021-09-21 VITALS — TEMPERATURE: 97.2 F

## 2021-09-21 DIAGNOSIS — L71.9 ROSACEA: ICD-10-CM

## 2021-09-21 DIAGNOSIS — L57.0 AK (ACTINIC KERATOSIS): ICD-10-CM

## 2021-09-21 DIAGNOSIS — Z85.828 HISTORY OF BASAL CELL CANCER: ICD-10-CM

## 2021-09-21 DIAGNOSIS — D48.5 NEOPLASM OF UNCERTAIN BEHAVIOR OF SKIN: Primary | ICD-10-CM

## 2021-09-21 DIAGNOSIS — D22.9 BENIGN NEVUS: ICD-10-CM

## 2021-09-21 PROCEDURE — 99214 OFFICE O/P EST MOD 30 MIN: CPT | Performed by: DERMATOLOGY

## 2021-09-21 PROCEDURE — 11103 TANGNTL BX SKIN EA SEP/ADDL: CPT | Performed by: DERMATOLOGY

## 2021-09-21 PROCEDURE — 17000 DESTRUCT PREMALG LESION: CPT | Performed by: DERMATOLOGY

## 2021-09-21 PROCEDURE — 11102 TANGNTL BX SKIN SINGLE LES: CPT | Performed by: DERMATOLOGY

## 2021-09-21 NOTE — PROGRESS NOTES
[facility]) Dermatology  Aldo Allen M.D.  [id number]  1945    77 y.o. male     Date of Visit: 2021    Chief Complaint:   Chief Complaint   Patient presents with    Skin Lesion        I was asked to see this patient by Dr. Polo ref. provider found. History of Present Illness:  1. Total-body skin exam    Persistent papule left nasal supratip and left inferior cheek-have persisted despite healing well after Efudex. Many actinic keratoses cleared. Had a flare of his rosacea after completion of Efudex-completed 2 months of minocycline and has been using MetroGel consistently without improvement of lesion left nasal supratip and left inferior cheek. Have bled. Not increasing in size. Rosacea-stable. Not completely clear, does develop inflammatory papules. MetroGel does seem helpful for improvement of inflammatory papules. Uses this consistently. Multiple nevi. Stable in size, shape, color. Has not noticed any new or changing pigmented lesions      Skin history:  History of actinic keratoses- status post Efudex 3/14 to his scalp      left upper forehead superficial basal cell carcinoma status post curettage     Rosacea- Metrogel . 75% script given . Flared after Efudex-minocycline for 2 months and MetroGel 0.75% 3/21     2/21 Efudex anterior scalp, forehead, temples, cheeks    Review of Systems:  Constitutional: Reports general sense of well-being       Past Medical History, Surgical History, Family History, Medications and Allergies reviewed.     Social History:   Social History     Socioeconomic History    Marital status:      Spouse name: Not on file    Number of children: Not on file    Years of education: Not on file    Highest education level: Not on file   Occupational History    Not on file   Tobacco Use    Smoking status: Former Smoker     Quit date: 1978     Years since quittin.1    Smokeless tobacco: Never Used   Vaping Use  Vaping Use: Never used   Substance and Sexual Activity    Alcohol use: No     Comment: 1-2 x year    Drug use: No    Sexual activity: Not on file   Other Topics Concern    Not on file   Social History Narrative    Not on file     Social Determinants of Health     Financial Resource Strain:     Difficulty of Paying Living Expenses:    Food Insecurity:     Worried About Running Out of Food in the Last Year:     [address] in the Last Year:    Transportation Needs:     Lack of Transportation (Medical):  Lack of Transportation (Non-Medical):    Physical Activity:     Days of Exercise per Week:     Minutes of Exercise per Session:    Stress:     Feeling of Stress :    Social Connections:     Frequency of Communication with Friends and Family:     Frequency of Social Gatherings with Friends and Family:     Attends Muslim Services:     Active Member of Clubs or Organizations:     Attends Club or Organization Meetings:     Marital Status:    Intimate Partner Violence:     Fear of Current or Ex-Partner:     Emotionally Abused:     Physically Abused:     Sexually Abused:        Physical Examination       -General: Well-appearing, NAD  1. Normal affect. Total body skin exam including scalp, face, neck, chest, abdomen, back, bilateral upper extremities, bilateral lower extremities, ocular conjunctiva, external lips, and nails was performed. Examination normal unless stated below. Underwear area not examined. Scattered on the trunk and extremities are multiple well-defined round and oval symmetric smoothly-bordered uniformly brown macules and papules.   Gritty erythematous papule right temple    Background erythema with 1+ inflammatory papules face  Well-healed scar no sign of recurrence    Left nasal supratip 3 mm erythematous papule rule out basal cell carcinoma  Left inferior cheek 1.4 cm erythematous crusted plaque-rule out nonmelanoma skin cancer              Assessment and Plan 1. Neoplasm of uncertain behavior of skin-left nasal supratip and left inferior cheek--Discussed possible diagnosis. Patient agreeable to biopsy. Verbal consent obtained after risks (infection, bleeding, scar), benefits and alternatives explained. -Area(s) to be biopsied were marked with a surgical pen. Site(s) were cleansed with alcohol. Local anesthesia achieved with 1% lidocaine with epinephrine/sodium bicarbonate. Shave biopsy performed with a razor blade. Hemostasis was achieved with aluminum chloride. The wound(s) were dressed with petrolatum and covered with a bandage. Specimen(s) sent to pathology. Pt educated re: risk of bleeding, infection, scar and wound care instructions. 2. AK (actinic keratosis)-right temple-one- lesion(s) treated w/ liquid nitrogen. Edu re: risk of blister formation, discomfort, scar, hypopigmentation. Discussed wound care. 3. Rosacea-MetroGel 0.75% twice daily-discussed episodic versus ongoing treatment for prevention. Minocycline for flares. May flare after Efudex if we need future courses   4. Benign nevus - Benign acquired melanocytic nevi  -Recommend monthly self skin exams   -Educated regarding the ABCDEs of melanoma detection   -Call for any new/changing moles or concerning lesions  -Reviewed sun protective behavior -- sun avoidance during the peak hours of the day, sun-protective clothing (including hat and sunglasses), sunscreen use (water resistant, broad spectrum, SPF at least 30, need for reapplication every 2 to 3 hours), avoidance of tanning beds      5. History of basal cell cancer - No evidence of recurrence. Discussed risk of subsequent skin cancers and increased risk of melanoma. Reviewed importance of monitoring skin for change and sun protection with hats and sunscreen, as well as sun avoidance.

## 2021-09-29 LAB — DERMATOLOGY PATHOLOGY REPORT: ABNORMAL

## 2021-09-30 ENCOUNTER — TELEPHONE (OUTPATIENT)
Dept: DERMATOLOGY | Age: 76
End: 2021-09-30

## 2021-09-30 DIAGNOSIS — C44.99 SEBACEOUS CARCINOMA: Primary | ICD-10-CM

## 2021-10-04 ENCOUNTER — PATIENT MESSAGE (OUTPATIENT)
Dept: DERMATOLOGY | Age: 76
End: 2021-10-04

## 2021-10-06 ENCOUNTER — TELEPHONE (OUTPATIENT)
Dept: SURGERY | Age: 76
End: 2021-10-06

## 2021-10-06 NOTE — TELEPHONE ENCOUNTER
Dr. Blanca Mckee is requesting slides from [facility] lab on this pt before scheduling Mohs. Sent request today via fax.

## 2021-10-07 NOTE — RESULT ENCOUNTER NOTE
Slides have been requested as of 10/6/2021 from [facility] per Dr. Elizabeth Esters request; once received we will look at the schedule to see when patient can be scheduled.

## 2021-10-14 NOTE — TELEPHONE ENCOUNTER
1 slide from [place] received from [place]. Mohs for sebaceous carcinoma rescheduled to 10/27 at 8:30 a.m.

## 2021-10-27 ENCOUNTER — PROCEDURE VISIT (OUTPATIENT)
Dept: SURGERY | Age: 76
End: 2021-10-27
Payer: MEDICARE

## 2021-10-27 VITALS — TEMPERATURE: 97.8 F | DIASTOLIC BLOOD PRESSURE: 74 MMHG | SYSTOLIC BLOOD PRESSURE: 142 MMHG

## 2021-10-27 DIAGNOSIS — C44.99 SEBACEOUS CARCINOMA: Primary | ICD-10-CM

## 2021-10-27 DIAGNOSIS — C44.391 OTHER SPECIFIED MALIGNANT NEOPLASM OF SKIN OF NOSE: ICD-10-CM

## 2021-10-27 PROCEDURE — 17311 MOHS 1 STAGE H/N/HF/G: CPT | Performed by: DERMATOLOGY

## 2021-10-27 PROCEDURE — 12051 INTMD RPR FACE/MM 2.5 CM/<: CPT | Performed by: DERMATOLOGY

## 2021-10-27 NOTE — PATIENT INSTRUCTIONS
[facility] Office Hours:    Monday-Thursday  7:30 AM-4:30 PM    Friday  9:00 AM-1:00 PM      POST-OPERATIVE CARE FOR STICHES  Bandage change after 48 hours    CARING FOR YOUR SURGICAL SITE  The bandage should remain on and completely dry for 48 hours. Do NOT get the bandage wet. 1. After the first 48 hours, gently remove the remaining part of the bandage. It can be helpful to moisten the bandage edges in the shower. Steri strips may still be on the wound. It is ok, they will fall off slowly with the daily bandage changes. 2. Gently clean the wound daily with mild soap and water. Try to clean off crust and debris. 3. Dry (pat) the area with a clean Q-tip or gauze. 4. Apply a layer of Vaseline/ Aquaphor (or Bacitracin if your doctor recommends) to the wound area only. 5. Cut a piece of Telfa (or any non-stick dressing) to fit just over the wound and secure it with paper tape. If the wound is small you may use a Band- Aid. Keep area covered for a total of 1 week(s). If the dressing comes off or if you have questions, or concerns about the dressing, please call the office for instructions! POST OPERATIVE INSTRUCTIONS    1. Activity: Do not lift anything heavier than a gallon of milk for 1 week. Also, avoid strenuous activity such as running, power walking or contact sports. 2. Eating and drinking: Do not drink alcohol for 48 hours after your procedure. Alcohol increases the chances of bleeding. 3. Medicines   -If you have discomfort, take Acetaminophen (Tylenol or Extra Strength Tylenol). Follow the instructions and warning on the bottle. -If your doctor has prescribed you an Aspirin daily, please keep taking it. Do not take extra Aspirin or medicines containing Aspirin (such as Radha-Schuyler and Excedrin) for 48 hours after your procedure. Bleeding: If bleeding occurs, DO NOT remove the bandage. Put firm pressure on the area with gauze for 20 minutes without peeking.  If the bleeding continues, apply pressure for another 20 minutes. If the bleeding does not stop after you apply pressure, call us right away. If you can not call, go to the nearest emergency room or urgent care facility. What to expect:  You may have these symptoms. They are normal and should get better with time:  1. Swelling. Swelling usually increases for the first 48 hours after your procedure and then begins to improve. Some soreness and redness around your wound. If we worked close to your eyes (forehead, nose, temple, or upper cheeks) your eyes may become swollen and/ or black and blue. 2. Bruising, which could last 1 week or more. 3. Pink and bumpy appearance to the scar. This may happen a few weeks after your procedure. After 4 weeks, you may gently massage the area each day with facial moisturizer or petroleum jelly (Vaseline or Aquaphor). This will help to smooth the skin and improve the appearance of the scar. The color of your scar will fade over time, but it may be pink for several months after the procedure. The scar may take 6 months to 1 year to reach its final color and appearance. 4. \"Spitting\" suture. Occasionally, an inside suture (stitch) does not completely dissolve. When this happens, (generally 4-8 weeks after surgery), it causes a bump or \"pimple\" to form on the scar. This is easily removed and is not at all serious. It does not mean the skin cancer has returned. Contact us if it happens, but do not be alarmed. Vitamin E oil is NOT necessary. A good moisturizer is just as effective. Sunscreen IS necessary. Use at least and SPF 30 sunscreen daily- even in winter    Call us at [phone] right away if you have any of the following symptoms:  -Bleeding that you can not stop (see highlighted area above). -Pain that lasts longer than 48 hours.  -Your wound becomes more painful, red or hot.   -Bruising and swelling that does not begin to improve within the 48 hours or gets worse suddenly.

## 2021-10-27 NOTE — PROGRESS NOTES
MOHS PROCEDURE NOTE    PHYSICIAN:  Juan Francisco Marquez MD, Who operated in two distinct and integrated capacities as the surgeon removing the tissue and as the pathologist examining the tissue. ASSISTANT: Eduardo Alexander RN     REFERRING PROVIDER:  Korin Hoffman MD    PREOPERATIVE DIAGNOSIS: Sebaceous Carcinoma     SPECIFIC MOHS INDICATIONS:  location and aggressive histologic growth pattern    AUC SCORIN/9    POSTOPERATIVE DIAGNOSIS: SAME    LOCATION: Left nasal supratip    OPERATIVE PROCEDURE:  MOHS MICROGRAPHIC SURGERY    RECONSTRUCTION OF DEFECT: Intermediate layered closure    PREOPERATIVE SIZE: 6x4 MM    DEFECT SIZE: 8x5 MM    LENGTH OF REPAIRED WOUND/SIZE OF FLAP/SIZE OF GRAFT:  20 MM    ANESTHESIA: 2.5 mL 1% lidocaine with epinephrine 1:100,000 buffered. EBL:  MINIMAL    DURATION OF PROCEDURE:  1.25 HOURS    POSTOPERATIVE OBSERVATION: 0.75 HOUR    SPECIMENS:  SEE MOHS MAP    COMPLICATIONS:  NONE    DESCRIPTION OF PROCEDURE:  The patient was given a mirror, as appropriate, and the biopsy site was identified, marked with a surgical marking pen, and verified by the patient. Options for treatment were discussed and the patient was informed that Mohs surgery was the selected treatment based on its lower recurrence rate, given the features listed above, as compared to other treatment modalities such as excision, radiation, or curettage, and agreed with this treatment plan. Risks and benefits including bruising, swelling, bleeding, infection, nerve injury, recurrence, and scarring were discussed with the patient prior to the procedure and a written consent detailing these and other risks was reviewed with the patient and signed. There was a time out for person and procedure verification. The surgical site was prepped with an antiseptic solution. Application of an antiseptic solution was repeated before each surgical stage.       Stage I:  The clinically-apparent tumor was carefully defined and debulked, determining the edge of the surgical excision. A thin layer of tumor-laden tissue was excised with a narrow margin of normal-appearing skin, using the technique of Mohs. A map was prepared to correspond to the area of skin from which it was excised. Hemostasis was achieved using electrosurgery. The wound was bandaged. The tissue was prepared for the cryostat and sectioned. 1 section(s) prepared. Each section was coded, cut, and stained for microscopic examination. The entire base and margins of the excised piece of tissue were examined by the surgeon. The tissue was examined to the level of subcutaneous fat. No tumor was identified at the peripheral margins of stage I of microscopically controlled surgery. DEFECT MANAGEMENT:    REPAIR DESCRIPTION:  Various closure modalities were discussed with the patient, and it was decided that an intermediate layered repair would best preserve normal anatomic and functional relationships. Additional risk of wound dehiscence was discussed. The area was anesthetized with 1% lidocaine with epinephrine 1:100,000 buffered, was given a sterile prep using Chlorhexidine gluconate 4% solution and draped in the usual sterile fashion. Recreation and enlargement of the wound was performed by excising cones of tissue via the triangulation technique. The final incision lines were placed with respect for the patient's natural skin tension lines in a linear configuration to avoid functional and aesthetic distortion of adjacent free margins. Following minimal undermining, meticulous hemostasis was obtained with spot monopolar electrocoagulation. Subcutaneous dead space and dermis were closed using 5-0 Vicryl buried subcutaneous interrupted suture and the epidermis was approximated with 6-0 Prolene running epidermal sutures.            WOUND COVERAGE:  The wound was cleaned with normal saline solution, dried off, Aquaphor ointment was applied, and the wound was covered. A dressing was applied for stabilization and light pressure. The patient was given detailed oral and written instructions on postoperative care. There were no complications. The patient left the Unit in good medical condition. FOLLOW-UP:  The patient will return for suture removal in 7 days.

## 2021-10-28 ENCOUNTER — TELEPHONE (OUTPATIENT)
Dept: SURGERY | Age: 76
End: 2021-10-28

## 2021-10-28 NOTE — TELEPHONE ENCOUNTER
The patient was in the office on 10/27/21 for Mohs located on the left nasal supratip with ILC repair. The patient tolerated the procedure well and left the office in good condition. Pain level on post-operative day 1:  none    Any bleeding episode that required pressure to be held, bandage change or a call to the office or MD?  no     Any other issues?:  no    A post-operative telephone call was placed at 1318 in order to check on the patient's recovery process. The patient reported doing well and had no complaints other than those listed above, if any. All of the patient's questions were answered.

## 2021-11-17 ENCOUNTER — TELEPHONE (OUTPATIENT)
Dept: SURGERY | Age: 76
End: 2021-11-17

## 2022-01-03 ENCOUNTER — PATIENT MESSAGE (OUTPATIENT)
Dept: DERMATOLOGY | Age: 77
End: 2022-01-03

## 2022-01-05 ENCOUNTER — OFFICE VISIT (OUTPATIENT)
Dept: DERMATOLOGY | Age: 77
End: 2022-01-05
Payer: MEDICARE

## 2022-01-05 VITALS — TEMPERATURE: 97.7 F

## 2022-01-05 DIAGNOSIS — C44.99 SEBACEOUS CARCINOMA: ICD-10-CM

## 2022-01-05 DIAGNOSIS — Z85.828 HISTORY OF BASAL CELL CANCER: ICD-10-CM

## 2022-01-05 DIAGNOSIS — D22.9 BENIGN NEVUS: ICD-10-CM

## 2022-01-05 DIAGNOSIS — L71.9 ROSACEA: Primary | ICD-10-CM

## 2022-01-05 PROCEDURE — 99214 OFFICE O/P EST MOD 30 MIN: CPT | Performed by: DERMATOLOGY

## 2022-01-05 RX ORDER — MINOCYCLINE HYDROCHLORIDE 50 MG/1
CAPSULE ORAL
Qty: 60 CAPSULE | Refills: 3 | Status: SHIPPED | OUTPATIENT
Start: 2022-01-05 | End: 2022-05-04 | Stop reason: SDUPTHER

## 2022-01-05 NOTE — PROGRESS NOTES
[facility]) Dermatology  Deanna Salazar M.D.  [id number] [address]  1945    77 y.o. male     Date of Visit: 1/5/2022    Chief Complaint:   Chief Complaint   Patient presents with    Skin Lesion        I was asked to see this patient by Dr. Melani andrade. provider found. History of Present Illness:  1. Patient presents today for follow-up and total-body skin exam.    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 Mutations in MLH1 2, [address] 2, [address] 6, PMS consistent with [place]-Gavino were negative. Multiple nevi-stable in size, shape, color. Has not noticed any new or changing pigmented lesions. Rosacea-currently flaring with multiple active inflammatory papules chin, jawline, nose. Has previously been on minocycline 100 mg p.o. twice daily which caused loose stool and GI discomfort-dose was reduced down to minocycline 100 mg p.o. daily with improvement of symptoms. Using only topical metronidazole currently. Skin history:  History of actinic keratoses- status post Efudex 3/14 to his scalp     11/17 left upper forehead superficial basal cell carcinoma status post curettage     Rosacea- Metrogel . 75% script given 11/18. 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     Social History:   Social History     Socioeconomic History    Marital status:      Spouse name: Not on file    Number of children: Not on file    Years of education: Not on file    Highest education level: Not on file   Occupational History    Not on file   Tobacco Use    Smoking status: Former Smoker     Quit date: 1978     Years since quittin.4    Smokeless tobacco: Never Used   Vaping Use    Vaping Use: Never used   Substance and Sexual Activity    Alcohol use: No     Comment: 1-2 x year    Drug use: No    Sexual activity: Not on file   Other Topics Concern    Not on file   Social History Narrative    Not on file     Social Determinants of Health     Financial Resource Strain:     Difficulty of Paying Living Expenses: Not on file   Food Insecurity:     Worried About [address] in the Last Year: Not on file    [address] [employer] in the Last Year: Not on file   Transportation Needs:     Lack of Transportation (Medical): Not on file    Lack of Transportation (Non-Medical): Not on file   Physical Activity:     Days of Exercise per Week: Not on file    Minutes of Exercise per Session: Not on file   Stress:     Feeling of Stress : Not on file   Social Connections:     Frequency of Communication with Friends and Family: Not on file    Frequency of Social Gatherings with Friends and Family: Not on file    Attends Oriental orthodox Services: Not on file    Active Member of [address] [place] [address] or Organizations: Not on file    Attends Club or Organization Meetings: Not on file    Marital Status: Not on file   Intimate Partner Violence:     Fear of Current or Ex-Partner: Not on file    Emotionally Abused: Not on file    Physically Abused: Not on file    Sexually Abused: Not on file   Housing Stability:     Unable to Pay for Housing in the Last Year: Not on file    Number of [place] in the Last Year: Not on file    Unstable Housing in the Last Year: Not on file       Physical Examination       -General: Well-appearing, NAD  1. Normal affect.   Total body skin exam including scalp, face, neck, chest, abdomen, back, bilateral upper extremities, bilateral lower extremities, ocular conjunctiva, external lips, and nails was performed. Examination normal unless stated below. Underwear area not examined. Scattered on the trunk and extremities are multiple well-defined round and oval symmetric smoothly-bordered uniformly brown macules and papules. 2-3+ active inflammatory papules nose, cheeks, chin, jawline  Well-healed scar no sign of recurrence, no lymphadenopathy neck      Assessment and Plan     1. Rosacea-chronic condition, not at treatment goal-restart minocycline but start at 50 mg p.o. twice daily for 1 month, then reduce down to 50 mg p.o. daily. Continue MetroGel 0.75% twice daily. Reviewed side effects and contraindications   2. Benign nevus - Benign acquired melanocytic nevi  -Recommend monthly self skin exams   -Educated regarding the ABCDEs of melanoma detection   -Call for any new/changing moles or concerning lesions  -Reviewed sun protective behavior -- sun avoidance during the peak hours of the day, sun-protective clothing (including hat and sunglasses), sunscreen use (water resistant, broad spectrum, SPF at least 30, need for reapplication every 2 to 3 hours), avoidance of tanning beds      3. Sebaceous carcinoma-monitor for any new or changing lesions-total-body skin exam 4 months   4. History of basal cell cancer - No evidence of recurrence. Discussed risk of subsequent skin cancers and increased risk of melanoma. Reviewed importance of monitoring skin for change and sun protection with hats and sunscreen, as well as sun avoidance.

## 2022-05-04 ENCOUNTER — OFFICE VISIT (OUTPATIENT)
Dept: DERMATOLOGY | Age: 77
End: 2022-05-04
Payer: MEDICARE

## 2022-05-04 VITALS — TEMPERATURE: 96.8 F

## 2022-05-04 DIAGNOSIS — L57.0 ACTINIC KERATOSIS TREATED WITH TOPICAL FLUOROURACIL (5FU): ICD-10-CM

## 2022-05-04 DIAGNOSIS — L71.9 ROSACEA: ICD-10-CM

## 2022-05-04 DIAGNOSIS — Z85.828 HISTORY OF BASAL CELL CANCER: ICD-10-CM

## 2022-05-04 DIAGNOSIS — L57.0 AK (ACTINIC KERATOSIS): Primary | ICD-10-CM

## 2022-05-04 DIAGNOSIS — D22.9 BENIGN NEVUS: ICD-10-CM

## 2022-05-04 DIAGNOSIS — Z85.828 HISTORY OF SEBACEOUS GLAND CARCINOMA: ICD-10-CM

## 2022-05-04 PROCEDURE — 17000 DESTRUCT PREMALG LESION: CPT | Performed by: DERMATOLOGY

## 2022-05-04 PROCEDURE — 17003 DESTRUCT PREMALG LES 2-14: CPT | Performed by: DERMATOLOGY

## 2022-05-04 PROCEDURE — 99214 OFFICE O/P EST MOD 30 MIN: CPT | Performed by: DERMATOLOGY

## 2022-05-04 RX ORDER — MINOCYCLINE HYDROCHLORIDE 50 MG/1
CAPSULE ORAL
Qty: 60 CAPSULE | Refills: 6 | Status: SHIPPED | OUTPATIENT
Start: 2022-05-04

## 2022-05-04 NOTE — PROGRESS NOTES
[facility]) Dermatology  Kalyn Herring M.D.  [id number] [address]  1945    69 y.o. male     Date of Visit: 5/4/2022    Chief Complaint:   Chief Complaint   Patient presents with    Skin Lesion        I was asked to see this patient by Dr. Polo ref. provider found. History of Present Illness:  1. Full-body skin exam    Actinic keratoses-slowly increasing in number across his forehead, scalp-1 more hypertrophic right frontal scalp. Not itching, bleeding. Nontender. Last completed Efudex 2/21    Rosacea-improved. Using MetroGel 0.75% about 3 nights for a week. On minocycline 50 mg p.o. daily-when we tried to discontinue minocycline, he develops a flare. Has not increased this dose. Tolerating minocycline without difficulty at this dose and when he takes it at night but develops nausea when he takes it in the morning. Multiple nevi. Stable in size, shape, color. Has not noticed any new or changing pigmented lesions. Skin history:  History of actinic keratoses- status post Efudex 3/14 to his scalp     11/17 left upper forehead superficial basal cell carcinoma status post curettage     Rosacea- Metrogel . 75% script given 11/18.  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     Social History:   Social History     Socioeconomic History    Marital status:      Spouse name: Not on file    Number of children: Not on file    Years of education: Not on file    Highest education level: Not on file   Occupational History    Not on file   Tobacco Use    Smoking status: Former Smoker     Quit date: 8/1/1978     Years since quitting: 43.7    Smokeless tobacco: Never Used   Vaping Use    Vaping Use: Never used   Substance and Sexual Activity    Alcohol use: No     Comment: 1-2 x year    Drug use: No    Sexual activity: Not on file   Other Topics Concern    Not on file   Social History Narrative    Not on file     Social Determinants of Health     Financial Resource Strain:     Difficulty of Paying Living Expenses: Not on file   Food Insecurity:     Worried About Running Out of Food in the Last Year: Not on file    Alejandro of Food in the Last Year: Not on file   Transportation Needs:     Lack of Transportation (Medical): Not on file    Lack of Transportation (Non-Medical): Not on file   Physical Activity:     Days of Exercise per Week: Not on file    Minutes of Exercise per Session: Not on file   Stress:     Feeling of Stress : Not on file   Social Connections:     Frequency of Communication with Friends and Family: Not on file    Frequency of Social Gatherings with Friends and Family: Not on file    Attends Restorationist Services: Not on file    Active Member of [address] [place] [address] or Organizations: Not on file    Attends Club or Organization Meetings: Not on file    Marital Status: Not on file   Intimate Partner Violence:     Fear of Current or Ex-Partner: Not on file    Emotionally Abused: Not on file    Physically Abused: Not on file    Sexually Abused: Not on file   Housing Stability:     Unable to Pay for Housing in the Last Year: Not on file    Number of [place] in the Last Year: Not on file    Unstable Housing in the Last Year: Not on file       Physical Examination       -General: Well-appearing, NAD  1. Normal affect. Total body skin exam including scalp, face, neck, chest, abdomen, back, bilateral upper extremities, bilateral lower extremities, ocular conjunctiva, external lips, and nails was performed. Examination normal unless stated below. Underwear area not examined.   Scattered on the trunk and extremities are multiple well-defined round and oval symmetric smoothly-bordered uniformly brown macules and papules. Multiple small gritty erythematous papules across his forehead and scalp-more hypertrophic papule right frontal scalp x2. Background erythema with less than 1+ inflammatory papules-old postinflammatory erythema and early scarring both preauricular cheeks, neck. Well-healed scar at site of prior basal cell carcinoma and sebaceous carcinoma no sign of recurrence      Assessment and Plan     1. AK (actinic keratosis) -2 right frontal scalp- lesion(s) treated w/ liquid nitrogen. Edu re: risk of blister formation, discomfort, scar, hypopigmentation. Discussed wound care. 2. Actinic keratosis treated with topical fluorouracil (5FU)-discussed Efudex 5% cream twice daily for 2 weeks forehead, scalp-defer treatment until fall. Reviewed proper use and side effects and patient aware of the importance of sun avoidance   3. Rosacea-continue minocycline 50 mg p.o. nightly. Increase to 100 mg p.o. nightly for 2 weeks during flares. Continue MetroGel 0.75%-increase to nightly or use for flares   4. Benign nevus - Benign acquired melanocytic nevi  -Recommend monthly self skin exams   -Educated regarding the ABCDEs of melanoma detection   -Call for any new/changing moles or concerning lesions  -Reviewed sun protective behavior -- sun avoidance during the peak hours of the day, sun-protective clothing (including hat and sunglasses), sunscreen use (water resistant, broad spectrum, SPF at least 30, need for reapplication every 2 to 3 hours), avoidance of tanning beds      5. History of sebaceous gland carcinoma-monitor for new or changing lesions   6. History of basal cell cancer - No evidence of recurrence. Discussed risk of subsequent skin cancers and increased risk of melanoma. Reviewed importance of monitoring skin for change and sun protection with hats and sunscreen, as well as sun avoidance.

## 2022-09-12 ENCOUNTER — OFFICE VISIT (OUTPATIENT)
Dept: DERMATOLOGY | Age: 77
End: 2022-09-12
Payer: MEDICARE

## 2022-09-12 DIAGNOSIS — L71.9 ROSACEA: ICD-10-CM

## 2022-09-12 DIAGNOSIS — L82.0 SEBORRHEIC KERATOSES, INFLAMED: Primary | ICD-10-CM

## 2022-09-12 DIAGNOSIS — Z85.828 HISTORY OF BASAL CELL CANCER: ICD-10-CM

## 2022-09-12 DIAGNOSIS — Z85.828 HISTORY OF SEBACEOUS GLAND CARCINOMA: ICD-10-CM

## 2022-09-12 DIAGNOSIS — L57.0 ACTINIC KERATOSIS TREATED WITH TOPICAL FLUOROURACIL (5FU): ICD-10-CM

## 2022-09-12 PROCEDURE — 99214 OFFICE O/P EST MOD 30 MIN: CPT | Performed by: DERMATOLOGY

## 2022-09-12 PROCEDURE — 1123F ACP DISCUSS/DSCN MKR DOCD: CPT | Performed by: DERMATOLOGY

## 2022-09-12 PROCEDURE — 17110 DESTRUCTION B9 LES UP TO 14: CPT | Performed by: DERMATOLOGY

## 2022-09-12 RX ORDER — FLUOROURACIL 50 MG/G
CREAM TOPICAL
Qty: 40 G | Refills: 0 | Status: SHIPPED | OUTPATIENT
Start: 2022-09-12

## 2022-09-12 NOTE — PROGRESS NOTES
[facility]) Dermatology  Jose Rosario M.D.  [id number] [address]  1945    69 y.o. male     Date of Visit: 2022    Chief Complaint:   Chief Complaint   Patient presents with    Skin Lesion        I was asked to see this patient by Dr. Polo ref. provider found. History of Present Illness:  1. Total-body skin exam    Irritated seborrheic keratosis right frontal scalp-raised, becoming crusted. Pruritic. Rosacea-periodic flares but has been quiescent recently. On minocycline 50 mg p.o. daily and uses MetroGel only intermittently. Mostly happy with his control. Increasing number of actinic keratoses across his forehead-dry. Not itching, bleeding. Asymptomatic    Skin history:  History of actinic keratoses- status post Efudex 3/14 to his scalp      left upper forehead superficial basal cell carcinoma status post curettage     Rosacea- Metrogel . 75% script given . 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     Social History:   Social History     Socioeconomic History    Marital status:      Spouse name: Not on file    Number of children: Not on file    Years of education: Not on file    Highest education level: Not on file   Occupational History    Not on file   Tobacco Use    Smoking status: Former     Types: Cigarettes     Quit date: 1978     Years since quittin.1    Smokeless tobacco: Never   Vaping Use    Vaping Use: Never used   Substance and Sexual Activity    Alcohol use: No     Comment: 1-2 x year    Drug use: No    Sexual activity: Not on file   Other Topics Concern    Not on file   Social History Narrative    Not on file     Social Determinants of Health     Financial Resource Strain: Not on file   Food Insecurity: Not on file   Transportation Needs: Not on file   Physical Activity: Not on file   Stress: Not on file   Social Connections: Not on file   Intimate Partner Violence: Not on file   Housing Stability: Not on file       Physical Examination       -General: Well-appearing, NAD  1. Normal affect. Total body skin exam including scalp, face, neck, chest, abdomen, back, bilateral upper extremities, bilateral lower extremities, ocular conjunctiva, external lips, and nails was performed. Examination normal unless stated below. Underwear area not examined. Scattered on the trunk and extremities are multiple well-defined round and oval symmetric smoothly-bordered uniformly brown macules and papules. Hyperkeratotic stuck on tan plaque right frontal scalp. Background erythema with scattered inflammatory papules especially neck, cheeks. Gritty pink papules forehead, temples. Background erythema scattered inflammatory papules along his mandible, neck. Well-healed scar nose  No lymphadenopathy neck    Assessment and Plan     1. Seborrheic keratoses, inflamed - After the risks, complications, and alternatives were explained to the patient, including risk of blister formation, discomfort, scar, hypopigmentation, patient elected to proceed with cryotherapy. 1-right frontal scalp lesion(s)  treated w/ liquid nitrogen using a Cryogun. 1 freeze thaw cycle was performed with a freeze time of 2-5 seconds. There were no immediate complications. Wound care instructions were discussed with the patient. 2. Actinic keratosis treated with topical fluorouracil (5FU)-Efudex 5% cream twice daily for 2 weeks or until lesions become erythematous and desquamate-forehead, temples, vertex of scalp. Reviewed side effects, contraindications, proper application. Discussed strict sun avoidance.    3. Rosacea -minocycline 50 mg p.o. daily-increase to 50 mg p.o. twice daily as needed flares. MetroGel as needed   4. History of sebaceous gland carcinoma - No evidence of recurrence. Discussed risk of subsequent skin cancers and increased risk of melanoma. Reviewed importance of monitoring skin for change and sun protection with hats and sunscreen, as well as sun avoidance. 5. History of basal cell cancer - No evidence of recurrence. Discussed risk of subsequent skin cancers and increased risk of melanoma. Reviewed importance of monitoring skin for change and sun protection with hats and sunscreen, as well as sun avoidance.

## 2023-01-11 ENCOUNTER — TELEPHONE (OUTPATIENT)
Dept: DERMATOLOGY | Age: 78
End: 2023-01-11

## 2023-01-11 NOTE — TELEPHONE ENCOUNTER
Called and spoke to patient explained PA is not required for Minocycline. Patient expressed understanding.

## 2023-01-11 NOTE — TELEPHONE ENCOUNTER
----- Message from [id number] [facility] [address] Sherryle Greenland sent at 1/11/2023  1:01 PM EST -----  Regarding: Minocycline  My cost for this doubled from last year (from $5 to $10). Ortiz said I  don't have any prior authorizations in the last 90 days.

## 2023-01-21 ENCOUNTER — PATIENT MESSAGE (OUTPATIENT)
Dept: DERMATOLOGY | Age: 78
End: 2023-01-21

## 2023-03-15 RX ORDER — MINOCYCLINE HYDROCHLORIDE 50 MG/1
CAPSULE ORAL
Qty: 60 CAPSULE | Refills: 6 | Status: SHIPPED | OUTPATIENT
Start: 2023-03-15

## 2023-03-20 ENCOUNTER — OFFICE VISIT (OUTPATIENT)
Dept: DERMATOLOGY | Age: 78
End: 2023-03-20

## 2023-03-20 DIAGNOSIS — L71.9 ROSACEA: ICD-10-CM

## 2023-03-20 DIAGNOSIS — Z85.828 HISTORY OF BASAL CELL CANCER: ICD-10-CM

## 2023-03-20 DIAGNOSIS — L82.0 SEBORRHEIC KERATOSES, INFLAMED: ICD-10-CM

## 2023-03-20 DIAGNOSIS — D48.5 NEOPLASM OF UNCERTAIN BEHAVIOR OF SKIN: Primary | ICD-10-CM

## 2023-03-20 DIAGNOSIS — Z85.828 HISTORY OF SEBACEOUS GLAND CARCINOMA: ICD-10-CM

## 2023-03-20 DIAGNOSIS — L57.0 AK (ACTINIC KERATOSIS): ICD-10-CM

## 2023-03-20 DIAGNOSIS — D22.9 BENIGN NEVUS: ICD-10-CM

## 2023-03-20 NOTE — PROGRESS NOTES
change and sun protection with hats and sunscreen, as well as sun avoidance. 7. History of basal cell cancer - No evidence of recurrence. Discussed risk of subsequent skin cancers and increased risk of melanoma. Reviewed importance of monitoring skin for change and sun protection with hats and sunscreen, as well as sun avoidance.

## 2023-03-23 LAB — DERMATOLOGY PATHOLOGY REPORT: NORMAL

## 2023-09-25 ENCOUNTER — OFFICE VISIT (OUTPATIENT)
Dept: DERMATOLOGY | Age: 78
End: 2023-09-25
Payer: MEDICARE

## 2023-09-25 DIAGNOSIS — L71.9 ROSACEA: ICD-10-CM

## 2023-09-25 DIAGNOSIS — Z85.828 HISTORY OF BASAL CELL CANCER: ICD-10-CM

## 2023-09-25 DIAGNOSIS — Z85.828 HISTORY OF SEBACEOUS GLAND CARCINOMA: ICD-10-CM

## 2023-09-25 DIAGNOSIS — B35.1 ONYCHOMYCOSIS: ICD-10-CM

## 2023-09-25 DIAGNOSIS — D22.9 BENIGN NEVUS: Primary | ICD-10-CM

## 2023-09-25 PROCEDURE — 99214 OFFICE O/P EST MOD 30 MIN: CPT | Performed by: DERMATOLOGY

## 2023-09-25 PROCEDURE — 1123F ACP DISCUSS/DSCN MKR DOCD: CPT | Performed by: DERMATOLOGY

## 2023-09-25 RX ORDER — MINOCYCLINE HYDROCHLORIDE 50 MG/1
CAPSULE ORAL
Qty: 60 CAPSULE | Refills: 6 | Status: SHIPPED | OUTPATIENT
Start: 2023-09-25

## 2023-09-25 NOTE — PROGRESS NOTES
[facility]) Dermatology  Chidi Dominguez M.D.  [address] [facility]  1945    69 y.o. male     Date of Visit: 9/25/2023    Chief Complaint:   Chief Complaint   Patient presents with    Skin Lesion        I was asked to see this patient by Dr. Polo ref. provider found. History of Present Illness:  1. TBSE    Multiple nevi. Patient has not noticed any new or changing pigmented lesions. Stable in size, shape, color. Not itching, bleeding. Onychomycosis-right great toenail removed, growing back normally. Had previously been treated topically. No evidence of recurrence yet    MetroGel-on minocycline 50 mg daily, currently taking 2 tabs with fairly good control. Not using MetroGel at all. Does use an over-the-counter moisturizer with sunscreen. Has tolerated minocycline without difficulty and had excellent improvement to rosacea        Skin history:  History of actinic keratoses- status post Efudex 3/14 to his scalp     11/17 left upper forehead superficial basal cell carcinoma status post curettage     Rosacea- Metrogel . 75% script given 11/18. 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     Social History:   Social History     Socioeconomic History    Marital status:      Spouse name: Not on file    Number of children: Not on file    Years of education: Not on file    Highest education level: Not on file   Occupational History    Not on file   Tobacco Use    Smoking status: Former     Types: Cigarettes     Quit date: 8/1/1978

## 2024-04-02 ENCOUNTER — OFFICE VISIT (OUTPATIENT)
Dept: DERMATOLOGY | Age: 79
End: 2024-04-02

## 2024-04-02 DIAGNOSIS — L82.0 SEBORRHEIC KERATOSES, INFLAMED: ICD-10-CM

## 2024-04-02 DIAGNOSIS — Z85.828 HISTORY OF SEBACEOUS GLAND CARCINOMA: ICD-10-CM

## 2024-04-02 DIAGNOSIS — L57.0 AK (ACTINIC KERATOSIS): Primary | ICD-10-CM

## 2024-04-02 DIAGNOSIS — L82.1 SEBORRHEIC KERATOSES: ICD-10-CM

## 2024-04-02 DIAGNOSIS — L71.9 ROSACEA: ICD-10-CM

## 2024-04-02 NOTE — PROGRESS NOTES
[facility] Dermatology  Philomena Shah M.D.  [phone]       Mitch Madrigal  1945    78 y.o. male     Date of Visit: 4/2/2024    Chief Complaint:   Chief Complaint   Patient presents with    Skin Lesion        I was asked to see this patient by Dr. Polo ref. provider found.    History of Present Illness:  1. TBSE    Multiple nevi. Patient has not noticed any new or changing pigmented lesions.   Stable in size, shape, color. Not itching, bleeding.     Multiple seborrheic keratoses torso, right scalp-mostly asymptomatic but lesion right scalp has increased in size, becoming crusted and easily traumatized by his comb    Rosacea-both ocular and cutaneous rosacea, develops inflammatory papules-currently on minocycline 50 mg p.o. daily but sometimes increases to 50 mg p.o. twice daily.  Not on MetroGel currently.  Does have liver function test done yearly.        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  Flared after Efudex-minocycline for 2 months and MetroGel 0.75% 3/21, minocycline 50 to 100 mg daily since 1/22 consistently 2021-history of sebaceous carcinoma on his nasal tip.  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  Mutations in MLH1 2, MSH 2, MSH 6, PMS consistent with [place]-Gavino were negative.     Onychomycosis--topical therapy, treated

## 2024-09-30 ENCOUNTER — HOSPITAL ENCOUNTER (INPATIENT)
Age: 79
LOS: 1 days | Discharge: HOME | DRG: 176 | End: 2024-10-02
Attending: EMERGENCY MEDICINE | Admitting: INTERNAL MEDICINE
Payer: MEDICARE

## 2024-09-30 DIAGNOSIS — I26.99 PULMONARY EMBOLISM, UNSPECIFIED CHRONICITY, UNSPECIFIED PULMONARY EMBOLISM TYPE, UNSPECIFIED WHETHER ACUTE COR PULMONALE PRESENT (HCC): ICD-10-CM

## 2024-09-30 DIAGNOSIS — I26.94 MULTIPLE SUBSEGMENTAL PULMONARY EMBOLI WITHOUT ACUTE COR PULMONALE (HCC): ICD-10-CM

## 2024-09-30 DIAGNOSIS — M79.604 RIGHT LEG PAIN: Primary | ICD-10-CM

## 2024-09-30 PROCEDURE — 99285 EMERGENCY DEPT VISIT HI MDM: CPT

## 2024-09-30 ASSESSMENT — PAIN SCALES - GENERAL: PAINLEVEL_OUTOF10: 6

## 2024-09-30 ASSESSMENT — PAIN - FUNCTIONAL ASSESSMENT: PAIN_FUNCTIONAL_ASSESSMENT: 0-10

## 2024-09-30 ASSESSMENT — PAIN DESCRIPTION - DESCRIPTORS: DESCRIPTORS: ACHING

## 2024-09-30 ASSESSMENT — PAIN DESCRIPTION - LOCATION: LOCATION: LEG

## 2024-09-30 ASSESSMENT — PAIN DESCRIPTION - ORIENTATION: ORIENTATION: RIGHT

## 2024-10-01 ENCOUNTER — APPOINTMENT (OUTPATIENT)
Dept: CT IMAGING | Age: 79
DRG: 176 | End: 2024-10-01
Payer: MEDICARE

## 2024-10-01 ENCOUNTER — HOSPITAL ENCOUNTER (INPATIENT)
Dept: VASCULAR LAB | Age: 79
Discharge: HOME | DRG: 176 | End: 2024-10-03
Payer: MEDICARE

## 2024-10-01 PROBLEM — I26.99 PULMONARY EMBOLISM, UNSPECIFIED CHRONICITY, UNSPECIFIED PULMONARY EMBOLISM TYPE, UNSPECIFIED WHETHER ACUTE COR PULMONALE PRESENT (HCC): Status: ACTIVE | Noted: 2024-10-01

## 2024-10-01 LAB
ALBUMIN SERPL-MCNC: 4.4 G/DL (ref 3.4–5)
ALBUMIN SERPL-MCNC: 4.6 G/DL (ref 3.4–5)
ALBUMIN/GLOB SERPL: 1.4 {RATIO} (ref 1.1–2.2)
ALBUMIN/GLOB SERPL: 1.6 {RATIO} (ref 1.1–2.2)
ALP SERPL-CCNC: 44 U/L (ref 40–129)
ALP SERPL-CCNC: 52 U/L (ref 40–129)
ALT SERPL-CCNC: 25 U/L (ref 10–40)
ALT SERPL-CCNC: 27 U/L (ref 10–40)
ANION GAP SERPL CALCULATED.3IONS-SCNC: 12 MMOL/L (ref 3–16)
ANION GAP SERPL CALCULATED.3IONS-SCNC: 13 MMOL/L (ref 3–16)
ANTI-XA UNFRAC HEPARIN: 0.93 IU/ML (ref 0.3–0.7)
ANTI-XA UNFRAC HEPARIN: >1.1 IU/ML (ref 0.3–0.7)
APTT BLD: 26 SEC (ref 22.1–36.4)
APTT BLD: 27.3 SEC (ref 22.1–36.4)
AST SERPL-CCNC: 18 U/L (ref 15–37)
AST SERPL-CCNC: 21 U/L (ref 15–37)
BASOPHILS # BLD: 0.1 K/UL (ref 0–0.2)
BASOPHILS # BLD: 0.1 K/UL (ref 0–0.2)
BASOPHILS NFR BLD: 0.9 %
BASOPHILS NFR BLD: 1.2 %
BILIRUB SERPL-MCNC: 0.4 MG/DL (ref 0–1)
BILIRUB SERPL-MCNC: 0.4 MG/DL (ref 0–1)
BUN SERPL-MCNC: 28 MG/DL (ref 7–20)
BUN SERPL-MCNC: 31 MG/DL (ref 7–20)
CALCIUM SERPL-MCNC: 10.1 MG/DL (ref 8.3–10.6)
CALCIUM SERPL-MCNC: 9.6 MG/DL (ref 8.3–10.6)
CHLORIDE SERPL-SCNC: 100 MMOL/L (ref 99–110)
CHLORIDE SERPL-SCNC: 97 MMOL/L (ref 99–110)
CK SERPL-CCNC: 88 U/L (ref 39–308)
CO2 SERPL-SCNC: 23 MMOL/L (ref 21–32)
CO2 SERPL-SCNC: 27 MMOL/L (ref 21–32)
CREAT SERPL-MCNC: 1.2 MG/DL (ref 0.8–1.3)
CREAT SERPL-MCNC: 1.5 MG/DL (ref 0.8–1.3)
D-DIMER QUANTITATIVE: 2.52 UG/ML FEU (ref 0–0.6)
DEPRECATED RDW RBC AUTO: 14.3 % (ref 12.4–15.4)
DEPRECATED RDW RBC AUTO: 14.5 % (ref 12.4–15.4)
ECHO BSA: 2.01 M2
EKG ATRIAL RATE: 78 BPM
EKG DIAGNOSIS: NORMAL
EKG P AXIS: 60 DEGREES
EKG P-R INTERVAL: 154 MS
EKG Q-T INTERVAL: 402 MS
EKG QRS DURATION: 92 MS
EKG QTC CALCULATION (BAZETT): 458 MS
EKG R AXIS: 89 DEGREES
EKG T AXIS: 33 DEGREES
EKG VENTRICULAR RATE: 78 BPM
EOSINOPHIL # BLD: 0.1 K/UL (ref 0–0.6)
EOSINOPHIL # BLD: 0.1 K/UL (ref 0–0.6)
EOSINOPHIL NFR BLD: 0.8 %
EOSINOPHIL NFR BLD: 1.2 %
GFR SERPLBLD CREATININE-BSD FMLA CKD-EPI: 47 ML/MIN/{1.73_M2}
GFR SERPLBLD CREATININE-BSD FMLA CKD-EPI: 62 ML/MIN/{1.73_M2}
GLUCOSE SERPL-MCNC: 119 MG/DL (ref 70–99)
GLUCOSE SERPL-MCNC: 144 MG/DL (ref 70–99)
HCT VFR BLD AUTO: 48.6 % (ref 40.5–52.5)
HCT VFR BLD AUTO: 50.2 % (ref 40.5–52.5)
HGB BLD-MCNC: 16.4 G/DL (ref 13.5–17.5)
HGB BLD-MCNC: 17 G/DL (ref 13.5–17.5)
INR PPP: 0.91 (ref 0.85–1.15)
LYMPHOCYTES # BLD: 1.5 K/UL (ref 1–5.1)
LYMPHOCYTES # BLD: 2.6 K/UL (ref 1–5.1)
LYMPHOCYTES NFR BLD: 15.6 %
LYMPHOCYTES NFR BLD: 27.4 %
MAGNESIUM SERPL-MCNC: 2.4 MG/DL (ref 1.8–2.4)
MCH RBC QN AUTO: 30.7 PG (ref 26–34)
MCH RBC QN AUTO: 31.1 PG (ref 26–34)
MCHC RBC AUTO-ENTMCNC: 33.7 G/DL (ref 31–36)
MCHC RBC AUTO-ENTMCNC: 33.8 G/DL (ref 31–36)
MCV RBC AUTO: 91 FL (ref 80–100)
MCV RBC AUTO: 91.8 FL (ref 80–100)
MONOCYTES # BLD: 0.8 K/UL (ref 0–1.3)
MONOCYTES # BLD: 0.8 K/UL (ref 0–1.3)
MONOCYTES NFR BLD: 8.1 %
MONOCYTES NFR BLD: 8.4 %
NEUTROPHILS # BLD: 5.9 K/UL (ref 1.7–7.7)
NEUTROPHILS # BLD: 7.4 K/UL (ref 1.7–7.7)
NEUTROPHILS NFR BLD: 61.8 %
NEUTROPHILS NFR BLD: 74.6 %
NT-PROBNP SERPL-MCNC: 107 PG/ML (ref 0–449)
PLATELET # BLD AUTO: 226 K/UL (ref 135–450)
PLATELET # BLD AUTO: 248 K/UL (ref 135–450)
PMV BLD AUTO: 7.6 FL (ref 5–10.5)
PMV BLD AUTO: 7.6 FL (ref 5–10.5)
POTASSIUM SERPL-SCNC: 4 MMOL/L (ref 3.5–5.1)
POTASSIUM SERPL-SCNC: 4.4 MMOL/L (ref 3.5–5.1)
PROT SERPL-MCNC: 7.1 G/DL (ref 6.4–8.2)
PROT SERPL-MCNC: 7.8 G/DL (ref 6.4–8.2)
PROTHROMBIN TIME: 12.5 SEC (ref 11.9–14.9)
RBC # BLD AUTO: 5.34 M/UL (ref 4.2–5.9)
RBC # BLD AUTO: 5.46 M/UL (ref 4.2–5.9)
SODIUM SERPL-SCNC: 136 MMOL/L (ref 136–145)
SODIUM SERPL-SCNC: 136 MMOL/L (ref 136–145)
TROPONIN, HIGH SENSITIVITY: 16 NG/L (ref 0–22)
WBC # BLD AUTO: 9.6 K/UL (ref 4–11)
WBC # BLD AUTO: 9.9 K/UL (ref 4–11)

## 2024-10-01 PROCEDURE — 93005 ELECTROCARDIOGRAM TRACING: CPT | Performed by: PHYSICIAN ASSISTANT

## 2024-10-01 PROCEDURE — 93970 EXTREMITY STUDY: CPT

## 2024-10-01 PROCEDURE — 93010 ELECTROCARDIOGRAM REPORT: CPT | Performed by: INTERNAL MEDICINE

## 2024-10-01 PROCEDURE — 96374 THER/PROPH/DIAG INJ IV PUSH: CPT

## 2024-10-01 PROCEDURE — 2580000003 HC RX 258: Performed by: INTERNAL MEDICINE

## 2024-10-01 PROCEDURE — 85520 HEPARIN ASSAY: CPT

## 2024-10-01 PROCEDURE — G0378 HOSPITAL OBSERVATION PER HR: HCPCS

## 2024-10-01 PROCEDURE — 85025 COMPLETE CBC W/AUTO DIFF WBC: CPT

## 2024-10-01 PROCEDURE — 6370000000 HC RX 637 (ALT 250 FOR IP): Performed by: INTERNAL MEDICINE

## 2024-10-01 PROCEDURE — 85730 THROMBOPLASTIN TIME PARTIAL: CPT

## 2024-10-01 PROCEDURE — 6370000000 HC RX 637 (ALT 250 FOR IP): Performed by: NURSE PRACTITIONER

## 2024-10-01 PROCEDURE — 83880 ASSAY OF NATRIURETIC PEPTIDE: CPT

## 2024-10-01 PROCEDURE — 6360000004 HC RX CONTRAST MEDICATION: Performed by: PHYSICIAN ASSISTANT

## 2024-10-01 PROCEDURE — 1200000000 HC SEMI PRIVATE

## 2024-10-01 PROCEDURE — 93970 EXTREMITY STUDY: CPT | Performed by: SURGERY

## 2024-10-01 PROCEDURE — 84484 ASSAY OF TROPONIN QUANT: CPT

## 2024-10-01 PROCEDURE — 36415 COLL VENOUS BLD VENIPUNCTURE: CPT

## 2024-10-01 PROCEDURE — 85379 FIBRIN DEGRADATION QUANT: CPT

## 2024-10-01 PROCEDURE — 83735 ASSAY OF MAGNESIUM: CPT

## 2024-10-01 PROCEDURE — 6360000002 HC RX W HCPCS: Performed by: PHYSICIAN ASSISTANT

## 2024-10-01 PROCEDURE — 80053 COMPREHEN METABOLIC PANEL: CPT

## 2024-10-01 PROCEDURE — 71260 CT THORAX DX C+: CPT

## 2024-10-01 PROCEDURE — 6370000000 HC RX 637 (ALT 250 FOR IP): Performed by: PHYSICIAN ASSISTANT

## 2024-10-01 PROCEDURE — 82550 ASSAY OF CK (CPK): CPT

## 2024-10-01 PROCEDURE — 96376 TX/PRO/DX INJ SAME DRUG ADON: CPT

## 2024-10-01 PROCEDURE — 85610 PROTHROMBIN TIME: CPT

## 2024-10-01 RX ORDER — HEPARIN SODIUM 1000 [USP'U]/ML
40 INJECTION, SOLUTION INTRAVENOUS; SUBCUTANEOUS PRN
Status: DISCONTINUED | OUTPATIENT
Start: 2024-10-01 | End: 2024-10-02

## 2024-10-01 RX ORDER — HEPARIN SODIUM 1000 [USP'U]/ML
80 INJECTION, SOLUTION INTRAVENOUS; SUBCUTANEOUS ONCE
Status: COMPLETED | OUTPATIENT
Start: 2024-10-01 | End: 2024-10-01

## 2024-10-01 RX ORDER — ACETAMINOPHEN 650 MG/1
650 SUPPOSITORY RECTAL EVERY 6 HOURS PRN
Status: DISCONTINUED | OUTPATIENT
Start: 2024-10-01 | End: 2024-10-02 | Stop reason: HOSPADM

## 2024-10-01 RX ORDER — ACETAMINOPHEN 325 MG/1
650 TABLET ORAL EVERY 6 HOURS PRN
Status: DISCONTINUED | OUTPATIENT
Start: 2024-10-01 | End: 2024-10-02 | Stop reason: HOSPADM

## 2024-10-01 RX ORDER — SODIUM CHLORIDE 0.9 % (FLUSH) 0.9 %
10 SYRINGE (ML) INJECTION PRN
Status: DISCONTINUED | OUTPATIENT
Start: 2024-10-01 | End: 2024-10-02 | Stop reason: HOSPADM

## 2024-10-01 RX ORDER — IOPAMIDOL 755 MG/ML
75 INJECTION, SOLUTION INTRAVASCULAR
Status: COMPLETED | OUTPATIENT
Start: 2024-10-01 | End: 2024-10-01

## 2024-10-01 RX ORDER — LISINOPRIL 20 MG/1
20 TABLET ORAL DAILY
Status: DISCONTINUED | OUTPATIENT
Start: 2024-10-01 | End: 2024-10-02 | Stop reason: HOSPADM

## 2024-10-01 RX ORDER — HEPARIN SODIUM 1000 [USP'U]/ML
80 INJECTION, SOLUTION INTRAVENOUS; SUBCUTANEOUS PRN
Status: DISCONTINUED | OUTPATIENT
Start: 2024-10-01 | End: 2024-10-02

## 2024-10-01 RX ORDER — SODIUM CHLORIDE 9 MG/ML
INJECTION, SOLUTION INTRAVENOUS PRN
Status: DISCONTINUED | OUTPATIENT
Start: 2024-10-01 | End: 2024-10-02 | Stop reason: HOSPADM

## 2024-10-01 RX ORDER — ONDANSETRON 2 MG/ML
4 INJECTION INTRAMUSCULAR; INTRAVENOUS EVERY 6 HOURS PRN
Status: DISCONTINUED | OUTPATIENT
Start: 2024-10-01 | End: 2024-10-02 | Stop reason: HOSPADM

## 2024-10-01 RX ORDER — NICOTINE 21 MG/24HR
1 PATCH, TRANSDERMAL 24 HOURS TRANSDERMAL DAILY PRN
Status: DISCONTINUED | OUTPATIENT
Start: 2024-10-01 | End: 2024-10-02 | Stop reason: HOSPADM

## 2024-10-01 RX ORDER — SODIUM CHLORIDE 0.9 % (FLUSH) 0.9 %
10 SYRINGE (ML) INJECTION EVERY 12 HOURS SCHEDULED
Status: DISCONTINUED | OUTPATIENT
Start: 2024-10-01 | End: 2024-10-02 | Stop reason: HOSPADM

## 2024-10-01 RX ORDER — ATORVASTATIN CALCIUM 10 MG/1
20 TABLET, FILM COATED ORAL NIGHTLY
Status: DISCONTINUED | OUTPATIENT
Start: 2024-10-01 | End: 2024-10-02 | Stop reason: HOSPADM

## 2024-10-01 RX ORDER — OXYCODONE AND ACETAMINOPHEN 7.5; 325 MG/1; MG/1
1 TABLET ORAL ONCE
Status: COMPLETED | OUTPATIENT
Start: 2024-10-01 | End: 2024-10-01

## 2024-10-01 RX ORDER — HEPARIN SODIUM 10000 [USP'U]/100ML
5-30 INJECTION, SOLUTION INTRAVENOUS CONTINUOUS
Status: DISCONTINUED | OUTPATIENT
Start: 2024-10-01 | End: 2024-10-02

## 2024-10-01 RX ORDER — HYDROCHLOROTHIAZIDE 25 MG/1
12.5 TABLET ORAL DAILY
Status: DISCONTINUED | OUTPATIENT
Start: 2024-10-01 | End: 2024-10-02 | Stop reason: HOSPADM

## 2024-10-01 RX ORDER — LANOLIN ALCOHOL/MO/W.PET/CERES
3 CREAM (GRAM) TOPICAL NIGHTLY PRN
Status: DISCONTINUED | OUTPATIENT
Start: 2024-10-01 | End: 2024-10-02 | Stop reason: HOSPADM

## 2024-10-01 RX ORDER — PROMETHAZINE HYDROCHLORIDE 25 MG/1
12.5 TABLET ORAL EVERY 6 HOURS PRN
Status: DISCONTINUED | OUTPATIENT
Start: 2024-10-01 | End: 2024-10-02 | Stop reason: HOSPADM

## 2024-10-01 RX ADMIN — HEPARIN SODIUM 15 UNITS/KG/HR: 10000 INJECTION, SOLUTION INTRAVENOUS at 12:00

## 2024-10-01 RX ADMIN — ATORVASTATIN CALCIUM 20 MG: 10 TABLET, FILM COATED ORAL at 20:55

## 2024-10-01 RX ADMIN — SODIUM CHLORIDE, PRESERVATIVE FREE 10 ML: 5 INJECTION INTRAVENOUS at 20:55

## 2024-10-01 RX ADMIN — HEPARIN SODIUM 18 UNITS/KG/HR: 10000 INJECTION, SOLUTION INTRAVENOUS at 04:23

## 2024-10-01 RX ADMIN — HEPARIN SODIUM 6700 UNITS: 1000 INJECTION INTRAVENOUS; SUBCUTANEOUS at 04:21

## 2024-10-01 RX ADMIN — OXYCODONE AND ACETAMINOPHEN 1 TABLET: 7.5; 325 TABLET ORAL at 01:10

## 2024-10-01 RX ADMIN — SODIUM CHLORIDE, PRESERVATIVE FREE 10 ML: 5 INJECTION INTRAVENOUS at 09:11

## 2024-10-01 RX ADMIN — LISINOPRIL 20 MG: 20 TABLET ORAL at 12:12

## 2024-10-01 RX ADMIN — HEPARIN SODIUM 13 UNITS/KG/HR: 10000 INJECTION, SOLUTION INTRAVENOUS at 23:59

## 2024-10-01 RX ADMIN — HYDROCHLOROTHIAZIDE 12.5 MG: 25 TABLET ORAL at 09:08

## 2024-10-01 RX ADMIN — IOPAMIDOL 75 ML: 755 INJECTION, SOLUTION INTRAVENOUS at 02:05

## 2024-10-01 ASSESSMENT — PAIN SCALES - GENERAL
PAINLEVEL_OUTOF10: 0
PAINLEVEL_OUTOF10: 0

## 2024-10-01 ASSESSMENT — PAIN DESCRIPTION - LOCATION: LOCATION: LEG

## 2024-10-01 NOTE — PROGRESS NOTES
Occupational / Physical Therapy    Therapy orders received and pt chart reviewed. Pt admitted for bilateral PE and heparin drip initiated 10/01/24 at 0423. Per therapy protocol, OT/PT will hold pt until 24 hours after heparin initiated to ensure safety w/ OOB activity. Will follow up tomorrow as medically appropriate. Thank you.     Dayne Forman, OTR/TENA Abbott, PT, DPT

## 2024-10-01 NOTE — PROGRESS NOTES
Patient admitted to C3 room 334 from ED. Transported via wheelchair. Belongings at bedside. Patient oriented to room,POC and call light use. 4 eye skin assessment done with Nurse Fernandez. Admission assessment complete as charted. Medications administered per MAR. Nonskid footware on. Bed in low position, wheels locked, SR x2, call light and bedside table within reach.    -A/O X4, VSS.  -on continuous Heparin drip.  -Kept on NPO except for ice chips and sips of water with meds.  -denies further needs at this time.

## 2024-10-01 NOTE — PROGRESS NOTES
..  4 Eyes Skin Assessment and Patient belongings     The patient is being assess for  Admission    I agree that 2 Nurses have performed a thorough Head to Toe Skin Assessment on the patient. ALL assessment sites listed below have been assessed.       Areas assessed by both nurses: KATHY Sullivan/ KATHY Fernandez  [x]   Head, Face, and Ears   [x]   Shoulders, Back, and Chest  [x]   Arms, Elbows, and Hands   [x]   Coccyx, Sacrum, and IschIum  [x]   Legs, Feet, and Heels        Does the Patient have Skin Breakdown?  No         Omkar Prevention initiated:  Yes   Wound Care Orders initiated:  No      [facility] nurse consulted for Pressure Injury (Stage 3,4, Unstageable, DTI, NWPT, and Complex wounds), New and Established Ostomies:  No      I agree that 2 Nurses have reviewed patient belongings with the patient/family and documented in the flowsheet upon admission or transfer to the unit.     Belongings  Dental Appliances: Partials (bottom)  Vision - Corrective Lenses: Eyeglasses  Hearing Aid: Bilateral hearing aids, At home  Clothing: Jacket/Coat, Footwear  Jewelry: Ring  Electronic Devices: Cell Phone  Weapons (Notify Protective Services/Security): None  Home Medications: None  Valuables Given To: Patient  Provide Name(s) of Who Valuable(s) Were Given To: patient       Nurse 1 eSignature: Electronically signed by Nate Fitzgerald RN on 10/1/24 at 6:26 AM EDT    **SHARE this note so that the co-signing nurse is able to place an eSignature**    Nurse 2 eSignature: Electronically signed by Mary Valera RN on 10/1/24 at 6:31 AM EDT

## 2024-10-01 NOTE — H&P
Hospital Medicine History & Physical      Date of Admission: 9/30/2024    Date of Service:  Pt seen/examined on 10/1/2024     [x]Admitted to Inpatient with expected LOS greater than two midnights due to medical therapy.  []Placed in Observation status.    Chief Admission Complaint:      Leg Pain (Pt states when he was sitting a pain went up his right leg. Pt took tylenol feels better but still pain  )       Presenting Admission History:      This is a 79 y.o. male who presented to [facility] with complaint of RLE pain. This has been going on for the past week . Pain waxes and wanes and he has been taking Tylenol for the pain. He came to the ED because the pain became severe and did not go away with Tylenol. He has never had a blood clot before. He denies recent travel or hormone use.      PMHx significant for:      Diagnosis Date    AK (actinic keratosis)     Cancer (HCC)     BCC 11/17    Glaucoma     Hepatitis     infectious- A?   age 9    Hyperlipidemia     Hypertension     .    In the ED:  He was tachycardic at 101 bpm. Vitals were otherwise stable. Patient was stable and the ED did feel he was okay for discharge form the ED on AC with BLE doppler in morning.      Workup included CBC without evidence of leukocytosis or acute anemia.  CMP without acute electrolyte abnormality.  Renal function slightly worse than normal with BUN of 31 and creatinine 1.5  CK was within normal limits at 88.  High sensitive troponin is 16 and BNP is 107.  D-dimer is 2.52    CT PE revealed:  Small subsegmental pulmonary emboli in the right and left lung lower lobes.  No evidence of right heart strain.     He as admitted for further evaluation and treatment of PE.     Assessment/Plan:      Current Principal Problem:  Pulmonary embolism, unspecified chronicity, unspecified pulmonary embolism type, unspecified whether acute cor pulmonale present (HCC)    Unprovoked Acute segmental emboli in right and left low lungs  - Continue

## 2024-10-01 NOTE — PLAN OF CARE
Problem: Pain  Goal: Verbalizes/displays adequate comfort level or baseline comfort level  Outcome: Progressing  Flowsheets (Taken 10/1/2024 0623)  Verbalizes/displays adequate comfort level or baseline comfort level:   Encourage patient to monitor pain and request assistance   Assess pain using appropriate pain scale   Administer analgesics based on type and severity of pain and evaluate response     Problem: Safety - Adult  Goal: Free from fall injury  Outcome: Progressing

## 2024-10-01 NOTE — ED NOTES
Mitch Madrigal is a 79 y.o. male admitted for  Principal Problem:    Pulmonary embolism, unspecified chronicity, unspecified pulmonary embolism type, unspecified whether acute cor pulmonale present (HCC)  Resolved Problems:    * No resolved hospital problems. *  .   Patient Home via self with   Chief Complaint   Patient presents with    Leg Pain     Pt states when he was sitting a pain went up his right leg. Pt took tylenol feels better but still pain     .  Patient is alert and Person, Place, Time, and Situation  Patient's baseline mobility: Baseline Mobility: Independent   Code Status: Full Code   Cardiac Rhythm:       Is patient on baseline Oxygen: no how many Liters 0 :   Abnormal Assessment Findings: PE    Isolation: None      NIH Score:    C-SSRS: Risk of Suicide: No Risk  Bedside swallow:        Active LDA's:   Peripheral IV 10/01/24 Left Antecubital (Active)   Site Assessment Clean, dry & intact 10/01/24 0008   Line Status Blood return noted;Flushed 10/01/24 0008       Peripheral IV 10/01/24 Right Antecubital (Active)   Site Assessment Clean, dry & intact 10/01/24 0357   Line Status Blood return noted;Flushed 10/01/24 0357     Patient admitted with a layton: no If the layton is chronic was it exchanged:NA  Reason for layton: Other (specify)   Patient admitted with Central Line:  NA . PICC line placement confirmed: YES OR NO:[id number]}   Reason for Central line: na   Was central line Inserted from an outside facility: no       Family/Caregiver Present no Any Concerns: no   Restraints no  Sitter no         Vitals: MEWS Score: 2    Vitals:    09/30/24 2223 10/01/24 0048 10/01/24 0355   BP: (!) 155/83  (!) 172/87   Pulse: (!) 101 90 87   Resp: 19 18 20   Temp: 98.1 °F (36.7 °C)     TempSrc: Oral     SpO2: 93% 92% 94%   Weight: 83.9 kg (185 lb)     Height: 1.727 m (5' 8\")         Last documented pain score (0-10 scale) Pain Level: 0  Pain medication administered  NA  .    Pertinent or High Risk Medications/Drips: Yes-  see MAR.    Pending Blood Product Administration: no    Abnormal labs:   Abnormal Labs Reviewed   D-DIMER, QUANTITATIVE - Abnormal; Notable for the following components:       Result Value    D-Dimer, Quant 2.52 (*)     All other components within normal limits   COMPREHENSIVE METABOLIC PANEL - Abnormal; Notable for the following components:    Chloride 97 (*)     Glucose 144 (*)     BUN 31 (*)     Creatinine 1.5 (*)     Est, Glom Filt Rate 47 (*)     All other components within normal limits     Critical values: no  Intervention for critical value(s):     Abnormal Imaging: yes,  CT scan            You may also review the ED PT Care Timeline found under the Summary Tab, ED Encounter Summary, Timeline Reports, ED Patient Care Timeline.     Recommendation    Pending orders/Uncompleted orders to hand off:  NA      Additional Comments: NA   If any further questions, please call Sending RN at [phone]

## 2024-10-01 NOTE — PLAN OF CARE
Problem: Pain  Goal: Verbalizes/displays adequate comfort level or baseline comfort level  10/1/2024 1137 by Demi Singh, RN  Outcome: Progressing  Flowsheets (Taken 10/1/2024 1137)  Verbalizes/displays adequate comfort level or baseline comfort level:   Encourage patient to monitor pain and request assistance   Assess pain using appropriate pain scale   Administer analgesics based on type and severity of pain and evaluate response   Implement non-pharmacological measures as appropriate and evaluate response   Consider cultural and social influences on pain and pain management       Problem: Safety - Adult  Goal: Free from fall injury  10/1/2024 1137 by Demi Singh, RN  Outcome: Progressing  Flowsheets (Taken 10/1/2024 1137)  Free From Fall Injury:   Instruct family/caregiver on patient safety   Based on caregiver fall risk screen, instruct family/caregiver to ask for assistance with transferring infant if caregiver noted to have fall risk factors

## 2024-10-01 NOTE — CONSULTS
Pharmacy to Manage Heparin Infusion per Hospital Nomogram    Dx: PE  Pt wt = _83.9 kg__ (actual weight)  Baseline aPTT = _27.3 sec_ at _0358_    Oral factor Xa-inhibitors may alter and elevate anti-Xa levels used for unfractionated heparin monitoring. As a result, anti-Xa monitoring is not accurate while Xa-inhibitor activity is detectable. Utilize aPTT monitoring when patient received an oral factor Xa-inhibitor (apixaban, betrixaban, edoxaban or rivaroxaban) within 72 hours prior to admission (please document last administration time). The goal is to allow a washout of oral factor Xa-inhibitors by using aPTT for 72 hours, then change to ant-Xa levels for UFH.     Heparin (weight-based) Infusion: VTE/DVT/PE  Heparin 80 units/kg IVP bolus (max 10,000 units) followed by Heparin infusion at 18 units/kg/hr (recommended max initial rate: 2100 units/hr).  Recheck anti-Xa (unless aPTT being used) in 6 hours.  Goal anti-Xa 0.3-0.7 IU/mL  Goal aPTT =  seconds.    Angelo Carey PharmD    10/1/2024 4:26 AM     10/1  - Anti-Xa = > 1.10   - Hold drip for 1 hrs, resume at noon - 15 units/kg/hr ( RN was informed )   - Recheck level at 18:00  - NP will order doppler , planning to switch to eliquis if dopper is negative .  Barby Boudreaux/Eric. 10/1/24 10:55 AM EDT    10/1 1831  Anti Xa 0.93 IU/mL  Reduce heparin drip rate to 13 units/kg/hr  Recheck Anti Xa in 6 hours at 0200  Celso Jane PharmD  10/1/2024 at 7:24 PM    10/2/2024  Recent Labs     10/02/24  0143   ANTIXAUHEP 0.77*   - Decrease Heparin infusion to _12_ units/kg/hr.  - Recheck Anti-Xa in 6 hours at 0830.  Angelo Carey PharmD    10/2/2024 2:25 AM     10/2/2024  anti-Xa level = 0.55 IU/mL at 0805  Continue Heparin infusion rate at 12 units/kg/hr   Daily level ordered  Next anti-Xa tomorrow (10/3) AM  Denisha Hammer, PharmD  10/2/2024 9:08 AM

## 2024-10-01 NOTE — ED PROVIDER NOTES
[facility]  ED  EMERGENCY DEPARTMENT ENCOUNTER        Pt Name: Mitch Madrigal  MRN: [id number]  Birthdate 1945  Date of evaluation: 9/30/2024  Provider: DENI Glover  PCP: Shoaib Aguilar MD  Note Started: 2:12 AM EDT 10/1/24       I have seen and evaluated this patient with my supervising physician Colt Salmeron MD.      CHIEF COMPLAINT       Chief Complaint   Patient presents with    Leg Pain     Pt states when he was sitting a pain went up his right leg. Pt took tylenol feels better but still pain         HISTORY OF PRESENT ILLNESS: 1 or more Elements     History from : Patient    Limitations to history : None    Mitch Madrigal is a 79 y.o. male who presents to the emergency department via private vehicle complaining of right leg pain.  Patient states he has had intermittent right leg pain over the past couple of days.  Typically the pain is sporadic and will go away fairly quickly.  Tonight the pain did worsen and has lasted for several hours at this point.  He states he did take Tylenol and had a little bit of relief from the pain.  He denies any history of blood clots.  No recent travel.  No hormone use.  Denies any chest pain or shortness of breath.    Nursing Notes were all reviewed and agreed with or any disagreements were addressed in the HPI.    REVIEW OF SYSTEMS :      Review of Systems    Positives and Pertinent negatives as per HPI.     SURGICAL HISTORY     Past Surgical History:   Procedure Laterality Date    CATARACT REMOVAL WITH IMPLANT Left 2/9/16    COLONOSCOPY      DENTAL SURGERY      EYE SURGERY Right 1/25/16    Cataract removal with implant Dr. Vazquez    KNEE ARTHROSCOPY Right 02/16/2018    MOHS SURGERY  10/27/2021    L nasal supratip       CURRENTMEDICATIONS       Previous Medications    ACETAMINOPHEN (TYLENOL) 325 MG TABLET    Take 2 tablets by mouth every 6 hours as needed for Pain    CALCIUM CARBONATE 600 MG TABS TABLET    Take  by mouth.    FLUOROURACIL

## 2024-10-01 NOTE — PROGRESS NOTES
Pt assessment completed and charted. VSS. Pt a/o. Pt denies any pain or weakness that was there previously.  Bed in lowest position and wheels locked. Call light within reach. Bedside table within reach. Non-skid socks in place. Pt denies any other needs at this time.  Pt calls out appropriately.

## 2024-10-01 NOTE — ED PROVIDER NOTES
THIS IS MY RONAN SUPERVISORY AND SHARED VISIT NOTE:    I personally saw the patient and made/approved the management plan and take responsibility for the patient management.    I independently performed a history and physical on Mitch Madrigal.   All diagnostic, treatment, and disposition decisions were made by myself in conjunction with the advanced practice provider. I personally saw the patient and performed a substantive portion of the visit including all aspects of the medical decision making.      For further details of Mitch Madrigal's emergency department encounter, please see DENI Glover's documentation.    History: Patient is a 79-year-old male presenting today due to concern for having some right leg pain while sitting today and being unsure where it came from.  By the time I saw him he reported the pain was mostly gone.  He did take some Tylenol today which helped.  He denies any history of blood clots.  No chest pain or shortness of breath.  No abdominal pain.  No numbness or weakness in the legs.  No back discomfort.  Due to concern for the calf discomfort, he came to the ED for further evaluation.      Physical exam:   Gen: No acute distress.  Alert and answering questions appropriately.  Psych: Normal mood and affect  HEENT: NCAT, MMM  Neck: supple  Cardiac: RRR, pulses 2+ in all 4 extremities including bilateral radial and posterior tibialis pulses, no calf swelling or tenderness bilaterally  Lungs: C2AB, no R/R/W  Abdomen: soft and nontender with no R/D/G  Neuro: no focal neuro deficits with strength and sensation 5/5 in all 4 extremities, GCS equals 15, normal ambulation, patellar reflexes 2+ bilaterally, normal strength with ankle dorsiflexion/plantarflexion bilaterally    The Ekg interpreted by me shows  normal sinus rhythm with a rate of 78  Axis is   Right axis deviation  QTc is  within an acceptable range  Intervals and Durations are unremarkable.      ST Segments: nonspecific changes  No

## 2024-10-01 NOTE — CARE COORDINATION
Case Management Assessment  Initial Evaluation    Date/Time of Evaluation: 10/1/2024 11:00 AM  Assessment Completed by: Adam Nichole RN    If patient is discharged prior to next notation, then this note serves as note for discharge by case management.    Patient Name: Mitch Madrigal                   YOB: 1945  Diagnosis: Right leg pain [M79.604]  Multiple subsegmental pulmonary emboli without acute cor pulmonale (HCC) [I26.94]  Pulmonary embolism, unspecified chronicity, unspecified pulmonary embolism type, unspecified whether acute cor pulmonale present (HCC) [I26.99]                   Date / Time: 9/30/2024 11:29 PM    Patient Admission Status: Inpatient   Readmission Risk (Low < 19, Mod (19-27), High > 27): Readmission Risk Score: 7.1    Current PCP: Shoaib Aguilar MD  PCP verified by CM?      Chart Reviewed: Yes      History Provided by:    Patient Orientation:      Patient Cognition:      Hospitalization in the last 30 days (Readmission):  No    If yes, Readmission Assessment in  Navigator will be completed.    Advance Directives:      Code Status: Full Code   Patient's Primary Decision Maker is:      Primary Decision Maker: Erin Madrigal - Spouse - [phone]    Discharge Planning:    Patient lives with: Spouse/Significant Other Type of Home: House  Primary Care Giver:    Patient Support Systems include: Spouse/Significant Other   Current Financial resources:    Current community resources:    Current services prior to admission: None            Current DME:              Type of Home Care services:  None    ADLS  Prior functional level:    Current functional level:      PT AM-PAC:   /24  OT AM-PAC:   /24    Family can provide assistance at DC:    Would you like Case Management to discuss the discharge plan with any other family members/significant others, and if so, who?    Plans to Return to Present Housing:    Other Identified Issues/Barriers to RETURNING to current housing:  none  Potential Assistance needed at discharge: N/A            Potential DME:    Patient expects to discharge to: House  Plan for transportation at discharge:      Financial    Payor: [employer] MEDICARE / Plan: [employer] MEDICARE-ADVANTAGE PPO / Product Type: Medicare /     Does insurance require precert for SNF: Yes    Potential assistance Purchasing Medications:    Meds-to-Beds request: Yes      [facility] [id number] - [place], [place] - [address] - P [phone] - F [phone]  [address]  [place] [address]  Phone: [phone] Fax: [phone]      Notes:    Factors facilitating achievement of predicted outcomes: Family support, Motivated, Cooperative, Pleasant, Sense of humor, and Good insight into deficits    Barriers to discharge: none    Additional Case Management Notes: spoke with patient wife and son. IPTA, uses no DME. Drives. Will be able to get to any follow  up appts. Denied any DCP needs. On heparin gtt. RA. Adam Nichole RN      The Plan for Transition of Care is related to the following treatment goals of Right leg pain [M79.604]  Multiple subsegmental pulmonary emboli without acute cor pulmonale (HCC) [I26.94]  Pulmonary embolism, unspecified chronicity, unspecified pulmonary embolism type, unspecified whether acute cor pulmonale present (HCC) [I26.99]    IF APPLICABLE: The Patient and/or patient representative Mitch and his family were provided with a choice of provider and agrees with the discharge plan. Freedom of choice list with basic dialogue that supports the patient's individualized plan of care/goals and shares the quality data associated with the providers was provided to:     Patient Representative Name:       The Patient and/or Patient Representative Agree with the Discharge Plan?      Adam Nichole RN  Case Management Department

## 2024-10-02 ENCOUNTER — APPOINTMENT (OUTPATIENT)
Age: 79
DRG: 176 | End: 2024-10-02
Payer: MEDICARE

## 2024-10-02 VITALS
BODY MASS INDEX: 27.28 KG/M2 | OXYGEN SATURATION: 92 % | RESPIRATION RATE: 16 BRPM | DIASTOLIC BLOOD PRESSURE: 69 MMHG | HEART RATE: 101 BPM | TEMPERATURE: 97.3 F | HEIGHT: 68 IN | SYSTOLIC BLOOD PRESSURE: 113 MMHG | WEIGHT: 180 LBS

## 2024-10-02 LAB
ALBUMIN SERPL-MCNC: 3.8 G/DL (ref 3.4–5)
ALBUMIN/GLOB SERPL: 1.5 {RATIO} (ref 1.1–2.2)
ALP SERPL-CCNC: 39 U/L (ref 40–129)
ALT SERPL-CCNC: 24 U/L (ref 10–40)
ANION GAP SERPL CALCULATED.3IONS-SCNC: 11 MMOL/L (ref 3–16)
ANTI-XA UNFRAC HEPARIN: 0.55 IU/ML (ref 0.3–0.7)
ANTI-XA UNFRAC HEPARIN: 0.77 IU/ML (ref 0.3–0.7)
AST SERPL-CCNC: 17 U/L (ref 15–37)
BASOPHILS # BLD: 0.1 K/UL (ref 0–0.2)
BASOPHILS NFR BLD: 1 %
BILIRUB SERPL-MCNC: 0.4 MG/DL (ref 0–1)
BUN SERPL-MCNC: 27 MG/DL (ref 7–20)
CALCIUM SERPL-MCNC: 9 MG/DL (ref 8.3–10.6)
CHLORIDE SERPL-SCNC: 98 MMOL/L (ref 99–110)
CO2 SERPL-SCNC: 24 MMOL/L (ref 21–32)
CREAT SERPL-MCNC: 1.2 MG/DL (ref 0.8–1.3)
DEPRECATED RDW RBC AUTO: 14.3 % (ref 12.4–15.4)
ECHO AO ASC DIAM: 3.2 CM
ECHO AO ASCENDING AORTA INDEX: 1.64 CM/M2
ECHO AO ROOT DIAM: 3.1 CM
ECHO AO ROOT INDEX: 1.59 CM/M2
ECHO AV AREA PEAK VELOCITY: 3.6 CM2
ECHO AV AREA VTI: 3.9 CM2
ECHO AV AREA/BSA PEAK VELOCITY: 1.8 CM2/M2
ECHO AV AREA/BSA VTI: 2 CM2/M2
ECHO AV MEAN GRADIENT: 7 MMHG
ECHO AV MEAN GRADIENT: 7 MMHG
ECHO AV MEAN VELOCITY: 1.3 M/S
ECHO AV PEAK GRADIENT: 13 MMHG
ECHO AV PEAK VELOCITY: 1.8 M/S
ECHO AV VELOCITY RATIO: 1.06
ECHO AV VTI: 28.1 CM
ECHO BSA: 1.98 M2
ECHO EST RA PRESSURE: 3 MMHG
ECHO LA AREA 2C: 10.4 CM2
ECHO LA AREA 4C: 15.1 CM2
ECHO LA DIAMETER INDEX: 1.18 CM/M2
ECHO LA DIAMETER: 2.3 CM
ECHO LA MAJOR AXIS: 6.1 CM
ECHO LA MINOR AXIS: 4 CM
ECHO LA TO AORTIC ROOT RATIO: 0.74
ECHO LA VOL MOD A2C: 22 ML (ref 18–58)
ECHO LA VOL MOD A4C: 30 ML (ref 18–58)
ECHO LA VOLUME INDEX MOD A2C: 11 ML/M2 (ref 16–34)
ECHO LA VOLUME INDEX MOD A4C: 15 ML/M2 (ref 16–34)
ECHO LV E' LATERAL VELOCITY: 10.8 CM/S
ECHO LV E' SEPTAL VELOCITY: 7 CM/S
ECHO LV EDV 3D: 171 ML
ECHO LV EDV INDEX 3D: 88 ML/M2
ECHO LV EF PHYSICIAN: 63 %
ECHO LV EJECTION FRACTION 3D: 60 %
ECHO LV ESV 3D: 68 ML
ECHO LV ESV INDEX 3D: 35 ML/M2
ECHO LV FRACTIONAL SHORTENING: 31 % (ref 28–44)
ECHO LV INTERNAL DIMENSION DIASTOLE INDEX: 1.85 CM/M2
ECHO LV INTERNAL DIMENSION DIASTOLIC: 3.6 CM (ref 4.2–5.9)
ECHO LV INTERNAL DIMENSION SYSTOLIC INDEX: 1.28 CM/M2
ECHO LV INTERNAL DIMENSION SYSTOLIC: 2.5 CM
ECHO LV IVSD: 0.9 CM (ref 0.6–1)
ECHO LV MASS 2D: 78.8 G (ref 88–224)
ECHO LV MASS 3D INDEX: 91.3 G/M2
ECHO LV MASS 3D: 178 G
ECHO LV MASS INDEX 2D: 40.4 G/M2 (ref 49–115)
ECHO LV POSTERIOR WALL DIASTOLIC: 0.7 CM (ref 0.6–1)
ECHO LV RELATIVE WALL THICKNESS RATIO: 0.39
ECHO LVOT AREA: 3.5 CM2
ECHO LVOT AV VTI INDEX: 1.13
ECHO LVOT DIAM: 2.1 CM
ECHO LVOT MEAN GRADIENT: 8 MMHG
ECHO LVOT PEAK GRADIENT: 14 MMHG
ECHO LVOT PEAK VELOCITY: 1.9 M/S
ECHO LVOT STROKE VOLUME INDEX: 56.3 ML/M2
ECHO LVOT SV: 109.7 ML
ECHO LVOT VTI: 31.7 CM
ECHO MV A VELOCITY: 0.99 M/S
ECHO MV E DECELERATION TIME (DT): 185 MS
ECHO MV E VELOCITY: 0.71 M/S
ECHO MV E/A RATIO: 0.72
ECHO MV E/E' LATERAL: 6.57
ECHO MV E/E' RATIO (AVERAGED): 8.36
ECHO MV E/E' SEPTAL: 10.14
ECHO RA AREA 4C: 12.4 CM2
ECHO RA END SYSTOLIC VOLUME APICAL 4 CHAMBER INDEX BSA: 13 ML/M2
ECHO RA VOLUME: 26 ML
ECHO RIGHT VENTRICULAR SYSTOLIC PRESSURE (RVSP): 25 MMHG
ECHO RV FREE WALL PEAK S': 13.9 CM/S
ECHO RV TAPSE: 2.1 CM (ref 1.7–?)
ECHO TV REGURGITANT MAX VELOCITY: 2.32 M/S
ECHO TV REGURGITANT PEAK GRADIENT: 22 MMHG
EOSINOPHIL # BLD: 0.2 K/UL (ref 0–0.6)
EOSINOPHIL NFR BLD: 1.9 %
GFR SERPLBLD CREATININE-BSD FMLA CKD-EPI: 62 ML/MIN/{1.73_M2}
GLUCOSE SERPL-MCNC: 120 MG/DL (ref 70–99)
HCT VFR BLD AUTO: 47.9 % (ref 40.5–52.5)
HGB BLD-MCNC: 15.7 G/DL (ref 13.5–17.5)
LYMPHOCYTES # BLD: 2.2 K/UL (ref 1–5.1)
LYMPHOCYTES NFR BLD: 24.4 %
MCH RBC QN AUTO: 30 PG (ref 26–34)
MCHC RBC AUTO-ENTMCNC: 32.8 G/DL (ref 31–36)
MCV RBC AUTO: 91.4 FL (ref 80–100)
MONOCYTES # BLD: 0.8 K/UL (ref 0–1.3)
MONOCYTES NFR BLD: 9.1 %
NEUTROPHILS # BLD: 5.7 K/UL (ref 1.7–7.7)
NEUTROPHILS NFR BLD: 63.6 %
PLATELET # BLD AUTO: 215 K/UL (ref 135–450)
PMV BLD AUTO: 7.8 FL (ref 5–10.5)
POTASSIUM SERPL-SCNC: 3.6 MMOL/L (ref 3.5–5.1)
PROT SERPL-MCNC: 6.3 G/DL (ref 6.4–8.2)
RBC # BLD AUTO: 5.24 M/UL (ref 4.2–5.9)
SODIUM SERPL-SCNC: 133 MMOL/L (ref 136–145)
WBC # BLD AUTO: 8.9 K/UL (ref 4–11)

## 2024-10-02 PROCEDURE — 93306 TTE W/DOPPLER COMPLETE: CPT

## 2024-10-02 PROCEDURE — 2580000003 HC RX 258: Performed by: INTERNAL MEDICINE

## 2024-10-02 PROCEDURE — G0378 HOSPITAL OBSERVATION PER HR: HCPCS

## 2024-10-02 PROCEDURE — 36415 COLL VENOUS BLD VENIPUNCTURE: CPT

## 2024-10-02 PROCEDURE — 80053 COMPREHEN METABOLIC PANEL: CPT

## 2024-10-02 PROCEDURE — 85025 COMPLETE CBC W/AUTO DIFF WBC: CPT

## 2024-10-02 PROCEDURE — 6370000000 HC RX 637 (ALT 250 FOR IP): Performed by: INTERNAL MEDICINE

## 2024-10-02 PROCEDURE — 6370000000 HC RX 637 (ALT 250 FOR IP): Performed by: NURSE PRACTITIONER

## 2024-10-02 PROCEDURE — 93306 TTE W/DOPPLER COMPLETE: CPT | Performed by: INTERNAL MEDICINE

## 2024-10-02 PROCEDURE — 85520 HEPARIN ASSAY: CPT

## 2024-10-02 RX ORDER — ENOXAPARIN SODIUM 100 MG/ML
1 INJECTION SUBCUTANEOUS 2 TIMES DAILY
Qty: 10 ML | Refills: 0 | Status: SHIPPED | OUTPATIENT
Start: 2024-10-02 | End: 2024-10-02 | Stop reason: ALTCHOICE

## 2024-10-02 RX ADMIN — SODIUM CHLORIDE, PRESERVATIVE FREE 10 ML: 5 INJECTION INTRAVENOUS at 08:01

## 2024-10-02 RX ADMIN — APIXABAN 10 MG: 5 TABLET, FILM COATED ORAL at 11:12

## 2024-10-02 RX ADMIN — LISINOPRIL 20 MG: 20 TABLET ORAL at 07:58

## 2024-10-02 RX ADMIN — HYDROCHLOROTHIAZIDE 12.5 MG: 25 TABLET ORAL at 07:58

## 2024-10-02 NOTE — PROGRESS NOTES
Occupational / Physical Therapy    Therapy orders received, pt chart reviewed and RN approved for therapy stating pt has been IND in room since last night. Pt seated up in chair on approach reporting IND mobility, transfers and ADLs in room while self-managing IV pole. Pt denies additional therapy needs/ concerns. Therapy signing off, please place new orders if additional concerns arise. Thank you.     Dayne Forman, OTR/L   Colt Velez, PT, DPT

## 2024-10-02 NOTE — PROGRESS NOTES
Discharge education provided both verbally and written, patient verbalized understanding, denies questions. Tele monitor removed, CMU notified. PIV removed without complications. Patient left with all belongings. Patient aware of prescriptions in outpatient pharmacy. Eliquis medication education provided by Joana, pharmacist.  Patient ate lunch prior to d/c

## 2024-10-02 NOTE — PLAN OF CARE
Problem: Pain  Goal: Verbalizes/displays adequate comfort level or baseline comfort level  10/2/2024 0757 by Miguelina Long, RN  Outcome: Progressing  10/2/2024 0005 by Nate Fitzgerald RN  Outcome: Progressing  Flowsheets (Taken 10/2/2024 0005)  Verbalizes/displays adequate comfort level or baseline comfort level:   Encourage patient to monitor pain and request assistance   Assess pain using appropriate pain scale   Administer analgesics based on type and severity of pain and evaluate response     Problem: Safety - Adult  Goal: Free from fall injury  10/2/2024 0757 by Miguelina Long, RN  Outcome: Progressing  10/2/2024 0005 by Nate Fitzgerald, RN  Outcome: Progressing

## 2024-10-02 NOTE — PROGRESS NOTES
Assessment completed and documented. VSS. A/ox4. Denies pain. Patient up to chair. Tolerating diet. Takes pills whole with water. Bed locked and in lowest position. Bedside table and call light within reach. Denies further needs at this time.

## 2024-10-02 NOTE — PROGRESS NOTES
- pt.thought eliquis would be $500 like his neighbor since they have the same insurance, so NP sent coumadin clinic referral to [facility] and sent lovenox 80mg bid X5 days rx to [facility] outpt,Rx.    - Lovenox copay $56 for 10 doses, recommended to be seen at the clinic on 10/7 for INR follow up.  - Canceled lovenox rx since eliquis is covered by pt.'s insurance .    - Eliquis  copay $47/ 30days supply - [facility] since [facility] can't use [facility] at our facility . will give first starter pack -10mg bid X7 days, followed by 5mg bid - month supply using [facility] [place] , [facility]  Barby Boudreaux/Everardo. 10/2/24 11:29 AM EDT

## 2024-10-02 NOTE — DISCHARGE SUMMARY
Hospital Medicine Discharge Summary    Patient: Mitch Madrigal   : 1945     Admit Date: 2024   Discharge Date:   10/2/2024  Disposition:  [x]Home   []HHC  []SNF  []ECF  []Acute Rehab  []LTAC  []Hospice  Code status:  [x]Full  []DNR/CCA  []Limited (DNR/CCA with Do Not Intubate)  []DNRCC  Condition at Discharge: Stable  Primary Care Provider: Shoaib Aguilar MD    Admitting Provider: Ginny Mora DO  Discharge Provider: Meghna Grossman, APRN - CNP     Discharge Diagnoses:      Active Hospital Problems    Diagnosis     Pulmonary embolism, unspecified chronicity, unspecified pulmonary embolism type, unspecified whether acute cor pulmonale present (HCC) [I26.99]        Presenting Admission History:      This is a 79 y.o. male who presented to [facility] with complaint of RLE pain. This has been going on for the past week . Pain waxes and wanes and he has been taking Tylenol for the pain. He came to the ED because the pain became severe and did not go away with Tylenol. He has never had a blood clot before. He denies recent travel or hormone use.        PMHx significant for:  Past Medical History            Diagnosis Date    AK (actinic keratosis)      Cancer (HCC)       BCC     Glaucoma      Hepatitis       infectious- A?   age 9    Hyperlipidemia      Hypertension         .    In the ED:  He was tachycardic at 101 bpm. Vitals were otherwise stable. Patient was stable and the ED did feel he was okay for discharge form the ED on AC with BLE doppler in morning.       Workup included CBC without evidence of leukocytosis or acute anemia.  CMP without acute electrolyte abnormality.  Renal function slightly worse than normal with BUN of 31 and creatinine 1.5  CK was within normal limits at 88.  High sensitive troponin is 16 and BNP is 107.  D-dimer is 2.52     CT PE revealed:  Small subsegmental pulmonary emboli in the right and left lung lower lobes.  No evidence of right heart strain.

## 2024-10-02 NOTE — CARE COORDINATION
Chart reviewed. Working on AC plan with pharmacy. Found Eliquis at $45/month. Spoke with patient he [facility]. Barby, pharmacist, forwarding script. Will provide first full month free with coupon. Adam Nichole RN

## 2024-10-02 NOTE — PLAN OF CARE
Problem: Pain  Goal: Verbalizes/displays adequate comfort level or baseline comfort level  10/2/2024 0005 by Nate Fitzgerald, RN  Outcome: Progressing  Flowsheets (Taken 10/2/2024 0005)  Verbalizes/displays adequate comfort level or baseline comfort level:   Encourage patient to monitor pain and request assistance   Assess pain using appropriate pain scale   Administer analgesics based on type and severity of pain and evaluate response     Problem: Safety - Adult  Goal: Free from fall injury  10/2/2024 0005 by Nate Fitzgerald, RN  Outcome: Progressing

## 2024-10-08 ENCOUNTER — OFFICE VISIT (OUTPATIENT)
Dept: DERMATOLOGY | Age: 79
End: 2024-10-08
Payer: MEDICARE

## 2024-10-08 DIAGNOSIS — Z85.828 HISTORY OF SEBACEOUS GLAND CARCINOMA: ICD-10-CM

## 2024-10-08 DIAGNOSIS — L57.0 AK (ACTINIC KERATOSIS): Primary | ICD-10-CM

## 2024-10-08 DIAGNOSIS — L82.1 SEBORRHEIC KERATOSES: ICD-10-CM

## 2024-10-08 DIAGNOSIS — D22.9 BENIGN NEVUS: ICD-10-CM

## 2024-10-08 DIAGNOSIS — L71.9 ROSACEA: ICD-10-CM

## 2024-10-08 DIAGNOSIS — Z85.828 HISTORY OF BASAL CELL CANCER: ICD-10-CM

## 2024-10-08 PROCEDURE — 99214 OFFICE O/P EST MOD 30 MIN: CPT | Performed by: DERMATOLOGY

## 2024-10-08 PROCEDURE — 1123F ACP DISCUSS/DSCN MKR DOCD: CPT | Performed by: DERMATOLOGY

## 2024-10-08 PROCEDURE — 17000 DESTRUCT PREMALG LESION: CPT | Performed by: DERMATOLOGY

## 2024-10-08 NOTE — PROGRESS NOTES
p.o. daily as needed flares.  Liver function test yearly   5. 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 History of basal cell cancer - No evidence of recurrence. Discussed risk of subsequent skin cancers and increased risk of melanoma. Reviewed importance of monitoring skin for change and sun protection with hats and sunscreen, as well as sun avoidance.

## 2024-11-19 ENCOUNTER — PATIENT MESSAGE (OUTPATIENT)
Dept: DERMATOLOGY | Age: 79
End: 2024-11-19

## 2024-11-20 ENCOUNTER — OFFICE VISIT (OUTPATIENT)
Dept: DERMATOLOGY | Age: 79
End: 2024-11-20

## 2024-11-20 DIAGNOSIS — L82.0 SEBORRHEIC KERATOSES, INFLAMED: Primary | ICD-10-CM

## 2024-11-20 NOTE — PROGRESS NOTES
[facility]  Philomena Shah M.D.  [phone]       Mitch Madrigal  1945    79 y.o. male     Date of Visit: 11/20/2024    Chief Complaint:   Chief Complaint   Patient presents with    Skin Lesion        I was asked to see this patient by Dr. Aguilar.    History of Present Illness:  1.  Patient presents today with his wife for evaluation of an irritated papule on his left posterior ear.  Lesion has been present for quite some time but recently increased in size, now becoming traumatized, may be rubbing on his hearing aid.      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  Flared after Efudex-minocycline for 2 months and MetroGel 0.75% 3/21, minocycline 50 to 100 mg daily since 1/22 consistently 2021-history of sebaceous carcinoma on his nasal tip.  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  Mutations in MLH1 2, MSH 2, MSH 6, PMS consistent with [place]-Gavino were negative.      Onychomycosis--topical therapy, [facility]    Review of Systems:  Constitutional: Reports general sense of well-being       Past Medical History, Surgical History, Family History, Medications and Allergies reviewed.    Social History:   Social History     Socioeconomic History    Marital status:      Spouse name: Not on file

## 2024-11-25 LAB — DERMATOLOGY PATHOLOGY REPORT: NORMAL

## 2025-01-21 RX ORDER — MINOCYCLINE HYDROCHLORIDE 50 MG/1
CAPSULE ORAL
Qty: 60 CAPSULE | Refills: 6 | Status: SHIPPED | OUTPATIENT
Start: 2025-01-21

## 2025-04-08 ENCOUNTER — OFFICE VISIT (OUTPATIENT)
Age: 80
End: 2025-04-08
Payer: MEDICARE

## 2025-04-08 DIAGNOSIS — L71.9 ROSACEA: ICD-10-CM

## 2025-04-08 DIAGNOSIS — L57.0 AK (ACTINIC KERATOSIS): ICD-10-CM

## 2025-04-08 DIAGNOSIS — D48.5 NEOPLASM OF UNCERTAIN BEHAVIOR OF SKIN: Primary | ICD-10-CM

## 2025-04-08 DIAGNOSIS — D22.9 BENIGN NEVUS: ICD-10-CM

## 2025-04-08 PROCEDURE — 1159F MED LIST DOCD IN RCRD: CPT | Performed by: DERMATOLOGY

## 2025-04-08 PROCEDURE — 17003 DESTRUCT PREMALG LES 2-14: CPT | Performed by: DERMATOLOGY

## 2025-04-08 PROCEDURE — 1123F ACP DISCUSS/DSCN MKR DOCD: CPT | Performed by: DERMATOLOGY

## 2025-04-08 PROCEDURE — 17000 DESTRUCT PREMALG LESION: CPT | Performed by: DERMATOLOGY

## 2025-04-08 PROCEDURE — 11102 TANGNTL BX SKIN SINGLE LES: CPT | Performed by: DERMATOLOGY

## 2025-04-08 PROCEDURE — 99214 OFFICE O/P EST MOD 30 MIN: CPT | Performed by: DERMATOLOGY

## 2025-04-08 NOTE — PROGRESS NOTES
Dc'd to home .    resistant, broad spectrum, SPF at least 30, need for reapplication every 2 to 3 hours), avoidance of tanning beds     4.  History of nonmelanoma skin cancer-no evidence of recurrence. Discussed risk of subsequent skin cancers and increased risk of melanoma. Reviewed importance of monitoring skin for change and sun protection with hats and sunscreen, as well as sun avoidance.     5. Ak-scalp, face-9 after the risks, complications, and alternatives were explained to the patient, including risk of blister formation, discomfort, scar, hypopigmentation, patient elected to proceed with cryotherapy. Lesion(s) were treated w/ liquid nitrogen using a Cryogun. 1 freeze thaw cycle was performed with a freeze time of 1-5 seconds.  There were no immediate complications.  Wound care instructions were discussed with the patient.    Consider field therapy with fluorouracil next visit      Follow-up: Regular skin exams and maintenance treatments.

## 2025-04-10 LAB — DERMATOLOGY PATHOLOGY REPORT: ABNORMAL

## 2025-04-13 ENCOUNTER — RESULTS FOLLOW-UP (OUTPATIENT)
Age: 80
End: 2025-04-13

## 2025-04-13 DIAGNOSIS — C44.41 BASAL CELL CARCINOMA (BCC) OF SCALP: Primary | ICD-10-CM

## 2025-04-24 ENCOUNTER — PROCEDURE VISIT (OUTPATIENT)
Dept: SURGERY | Age: 80
End: 2025-04-24
Payer: MEDICARE

## 2025-04-24 VITALS — DIASTOLIC BLOOD PRESSURE: 90 MMHG | HEART RATE: 74 BPM | SYSTOLIC BLOOD PRESSURE: 147 MMHG

## 2025-04-24 DIAGNOSIS — C44.41 BASAL CELL CARCINOMA, SCALP/NECK: Primary | ICD-10-CM

## 2025-04-24 PROCEDURE — 12032 INTMD RPR S/A/T/EXT 2.6-7.5: CPT | Performed by: DERMATOLOGY

## 2025-04-24 PROCEDURE — 17311 MOHS 1 STAGE H/N/HF/G: CPT | Performed by: DERMATOLOGY

## 2025-04-24 NOTE — PROGRESS NOTES
MOHS PROCEDURE NOTE    PHYSICIAN:  Alycia Michel MD, Who operated in two distinct and integrated capacities as the surgeon removing the tissue and as the pathologist examining the tissue.    ASSISTANT: Fernandez Guadarrama RN; Anitha Gregory RN; Jose Angel Azul LPN     REFERRING PROVIDER:  Philomena Shah MD    PREOPERATIVE DIAGNOSIS: Superficial and nodular Basal Cell Carcinoma     SPECIFIC MOHS INDICATIONS:  location and need for tissue conservation    AUC SCORIN/9    POSTOPERATIVE DIAGNOSIS: SAME    LOCATION: Left Frontal Scalp    OPERATIVE PROCEDURE:  MOHS MICROGRAPHIC SURGERY    RECONSTRUCTION OF DEFECT: Intermediate layered closure    PREOPERATIVE SIZE: 7 x 6 MM    DEFECT SIZE: 9 x 8 MM    LENGTH OF REPAIRED WOUND/SIZE OF FLAP/SIZE OF GRAFT:  31 MM    ANESTHESIA:  8 mL 1% lidocaine with epinephrine 1:100,000 buffered.     EBL:  MINIMAL    DURATION OF PROCEDURE:  1 HOUR    POSTOPERATIVE OBSERVATION: 40 MIN    SPECIMENS:  SEE MOHS MAP    COMPLICATIONS:  NONE    DESCRIPTION OF PROCEDURE:  The patient was given a mirror, as appropriate, and the biopsy site was identified, marked with a surgical marking pen, and verified by the patient.   Options for treatment were discussed and the patient was informed that Mohs surgery was the selected treatment based on its lower recurrence rate, given the features listed above, as compared to other treatment modalities such as excision, radiation, or curettage, and agreed with this treatment plan.  Risks and benefits including bruising, swelling, bleeding, infection, nerve injury, recurrence, and scarring were discussed with the patient prior to the procedure and a written consent detailing these and other risks was reviewed with the patient and signed.    There was a time out for person and procedure verification.  The surgical site was prepped with an antiseptic solution.  Application of an antiseptic solution was repeated before each surgical stage.      Stage I:

## 2025-04-25 ENCOUNTER — TELEPHONE (OUTPATIENT)
Dept: SURGERY | Age: 80
End: 2025-04-25

## 2025-04-25 NOTE — TELEPHONE ENCOUNTER
The patient was in the office on 4/24/2025 for Mohs surgery located on the left frontal scalp with ILC repair.  The patient tolerated the procedure well and left the office in good condition.    A post-operative telephone call was placed at 10:18 am in order to check on the patient's recovery process.  The patient was not able to be reached and a phone message was left.